# Patient Record
Sex: FEMALE | Race: WHITE | Employment: OTHER | URBAN - METROPOLITAN AREA
[De-identification: names, ages, dates, MRNs, and addresses within clinical notes are randomized per-mention and may not be internally consistent; named-entity substitution may affect disease eponyms.]

---

## 2017-08-15 PROBLEM — K21.9 GERD (GASTROESOPHAGEAL REFLUX DISEASE): Status: ACTIVE | Noted: 2017-08-15

## 2017-08-15 PROBLEM — F32.A ANXIETY AND DEPRESSION: Status: ACTIVE | Noted: 2017-08-15

## 2017-08-15 PROBLEM — M51.36 DEGENERATIVE DISC DISEASE, LUMBAR: Status: ACTIVE | Noted: 2017-08-15

## 2017-08-15 PROBLEM — G89.29 CHRONIC BACK PAIN: Status: ACTIVE | Noted: 2017-08-15

## 2017-08-15 PROBLEM — E87.6 HYPOKALEMIA: Status: ACTIVE | Noted: 2017-08-15

## 2017-08-15 PROBLEM — I10 HTN (HYPERTENSION): Status: ACTIVE | Noted: 2017-08-15

## 2017-08-15 PROBLEM — R59.0 INTRA-ABDOMINAL LYMPHADENOPATHY: Status: ACTIVE | Noted: 2017-08-15

## 2017-08-15 PROBLEM — E78.5 HLD (HYPERLIPIDEMIA): Status: ACTIVE | Noted: 2017-08-15

## 2017-08-15 PROBLEM — F41.9 ANXIETY AND DEPRESSION: Status: ACTIVE | Noted: 2017-08-15

## 2017-08-15 PROBLEM — K62.5 RECTAL BLEEDING: Status: ACTIVE | Noted: 2017-08-15

## 2017-08-15 PROBLEM — M54.9 CHRONIC BACK PAIN: Status: ACTIVE | Noted: 2017-08-15

## 2017-08-24 PROBLEM — C91.10 CLL (CHRONIC LYMPHOCYTIC LEUKEMIA) (HCC): Status: ACTIVE | Noted: 2017-08-24

## 2017-08-24 PROBLEM — G89.29 CHRONIC LOW BACK PAIN: Status: ACTIVE | Noted: 2017-08-24

## 2017-08-24 PROBLEM — C83.00 MALIGNANT LYMPHOMA, SMALL LYMPHOCYTIC (HCC): Status: ACTIVE | Noted: 2017-08-24

## 2017-08-24 PROBLEM — M54.50 CHRONIC LOW BACK PAIN: Status: ACTIVE | Noted: 2017-08-24

## 2017-08-29 ENCOUNTER — HOSPITAL ENCOUNTER (OUTPATIENT)
Dept: CT IMAGING | Age: 62
Discharge: HOME | End: 2017-08-29
Attending: INTERNAL MEDICINE | Admitting: INTERNAL MEDICINE

## 2017-08-29 DIAGNOSIS — C83.00 SMALL CELL B-CELL LYMPHOMA (HCC): ICD-10-CM

## 2017-08-29 DIAGNOSIS — C83.00 MALIGNANT LYMPHOMA, SMALL LYMPHOCYTIC (HCC): ICD-10-CM

## 2017-09-01 ENCOUNTER — PRE-PROCEDURE TELEPHONE (OUTPATIENT)
Dept: INTERVENTIONAL RADIOLOGY/VASCULAR | Age: 62
End: 2017-09-01

## 2017-09-05 ENCOUNTER — HOSPITAL ENCOUNTER (OUTPATIENT)
Dept: CT IMAGING | Age: 62
Discharge: HOME | End: 2017-09-05
Attending: NURSE PRACTITIONER | Admitting: NURSE PRACTITIONER

## 2017-09-05 VITALS
HEIGHT: 63 IN | WEIGHT: 189 LBS | BODY MASS INDEX: 33.49 KG/M2 | HEART RATE: 83 BPM | DIASTOLIC BLOOD PRESSURE: 84 MMHG | OXYGEN SATURATION: 99 % | RESPIRATION RATE: 16 BRPM | TEMPERATURE: 98.1 F | SYSTOLIC BLOOD PRESSURE: 142 MMHG

## 2017-09-05 DIAGNOSIS — C83.00 MALIGNANT LYMPHOMA, SMALL LYMPHOCYTIC (HCC): ICD-10-CM

## 2017-09-05 DIAGNOSIS — C83.00 SMALL CELL B-CELL LYMPHOMA (HCC): ICD-10-CM

## 2017-09-05 LAB
A/G RATIO: 1.4 (ref 1.1–2.2)
ALBUMIN SERPL-MCNC: 4.6 G/DL (ref 3.4–5)
ALP BLD-CCNC: 95 U/L (ref 40–129)
ALT SERPL-CCNC: 16 U/L (ref 10–40)
ANION GAP SERPL CALCULATED.3IONS-SCNC: 16 MMOL/L (ref 3–16)
APTT: 31.5 SEC (ref 24.1–34.9)
AST SERPL-CCNC: 19 U/L (ref 15–37)
BASOPHILS ABSOLUTE: 0.1 K/UL (ref 0–0.2)
BASOPHILS RELATIVE PERCENT: 0.6 %
BILIRUB SERPL-MCNC: <0.2 MG/DL (ref 0–1)
BUN BLDV-MCNC: 7 MG/DL (ref 7–20)
CALCIUM SERPL-MCNC: 9.2 MG/DL (ref 8.3–10.6)
CHLORIDE BLD-SCNC: 103 MMOL/L (ref 99–110)
CO2: 26 MMOL/L (ref 21–32)
CREAT SERPL-MCNC: 0.7 MG/DL (ref 0.6–1.1)
EOSINOPHILS ABSOLUTE: 0.1 K/UL (ref 0–0.6)
EOSINOPHILS RELATIVE PERCENT: 1.1 %
GFR AFRICAN AMERICAN: >60
GFR NON-AFRICAN AMERICAN: >60
GLOBULIN: 3.2 G/DL
GLUCOSE BLD-MCNC: 110 MG/DL (ref 70–99)
HCT VFR BLD CALC: 35.4 % (ref 36–48)
HEMOGLOBIN: 11.7 G/DL (ref 12–16)
INR BLD: 1.04 (ref 0.85–1.15)
LYMPHOCYTES ABSOLUTE: 2.1 K/UL (ref 1–5.1)
LYMPHOCYTES RELATIVE PERCENT: 23.6 %
MCH RBC QN AUTO: 30.2 PG (ref 26–34)
MCHC RBC AUTO-ENTMCNC: 33 G/DL (ref 31–36)
MCV RBC AUTO: 91.6 FL (ref 80–100)
MONOCYTES ABSOLUTE: 0.5 K/UL (ref 0–1.3)
MONOCYTES RELATIVE PERCENT: 5.8 %
NEUTROPHILS ABSOLUTE: 6 K/UL (ref 1.7–7.7)
NEUTROPHILS RELATIVE PERCENT: 68.9 %
PDW BLD-RTO: 13.9 % (ref 12.4–15.4)
PLATELET # BLD: 297 K/UL (ref 135–450)
PMV BLD AUTO: 8 FL (ref 5–10.5)
POTASSIUM SERPL-SCNC: 3.7 MMOL/L (ref 3.5–5.1)
PROTHROMBIN TIME: 11.8 SEC (ref 9.6–13)
RBC # BLD: 3.87 M/UL (ref 4–5.2)
SODIUM BLD-SCNC: 145 MMOL/L (ref 136–145)
TOTAL PROTEIN: 7.8 G/DL (ref 6.4–8.2)
WBC # BLD: 8.7 K/UL (ref 4–11)

## 2017-09-05 RX ORDER — ONDANSETRON 2 MG/ML
4 INJECTION INTRAMUSCULAR; INTRAVENOUS EVERY 8 HOURS PRN
Status: DISCONTINUED | OUTPATIENT
Start: 2017-09-05 | End: 2017-09-06 | Stop reason: HOSPADM

## 2017-09-05 RX ORDER — FENTANYL CITRATE 50 UG/ML
INJECTION, SOLUTION INTRAMUSCULAR; INTRAVENOUS
Status: COMPLETED | OUTPATIENT
Start: 2017-09-05 | End: 2017-09-05

## 2017-09-05 RX ORDER — MIDAZOLAM HYDROCHLORIDE 1 MG/ML
INJECTION INTRAMUSCULAR; INTRAVENOUS
Status: COMPLETED | OUTPATIENT
Start: 2017-09-05 | End: 2017-09-05

## 2017-09-05 RX ADMIN — MIDAZOLAM HYDROCHLORIDE 2 MG: 1 INJECTION INTRAMUSCULAR; INTRAVENOUS at 10:42

## 2017-09-05 RX ADMIN — FENTANYL CITRATE 50 MCG: 50 INJECTION, SOLUTION INTRAMUSCULAR; INTRAVENOUS at 10:45

## 2017-09-05 RX ADMIN — FENTANYL CITRATE 50 MCG: 50 INJECTION, SOLUTION INTRAMUSCULAR; INTRAVENOUS at 10:42

## 2017-09-05 RX ADMIN — MIDAZOLAM HYDROCHLORIDE 1 MG: 1 INJECTION INTRAMUSCULAR; INTRAVENOUS at 10:45

## 2017-09-12 PROBLEM — R60.0 BILATERAL LOWER EXTREMITY EDEMA: Status: ACTIVE | Noted: 2017-09-12

## 2017-09-18 LAB
Lab: NORMAL
Lab: NORMAL
REPORT: NORMAL
REPORT: NORMAL
THIS TEST SENT TO: NORMAL
THIS TEST SENT TO: NORMAL

## 2017-10-10 ENCOUNTER — HOSPITAL ENCOUNTER (OUTPATIENT)
Dept: INTERVENTIONAL RADIOLOGY/VASCULAR | Age: 62
Discharge: HOME | End: 2017-10-10
Attending: INTERNAL MEDICINE | Admitting: INTERNAL MEDICINE

## 2017-10-10 VITALS
OXYGEN SATURATION: 95 % | DIASTOLIC BLOOD PRESSURE: 76 MMHG | SYSTOLIC BLOOD PRESSURE: 120 MMHG | TEMPERATURE: 98 F | WEIGHT: 183 LBS | HEIGHT: 62 IN | HEART RATE: 70 BPM | BODY MASS INDEX: 33.68 KG/M2 | RESPIRATION RATE: 20 BRPM

## 2017-10-10 DIAGNOSIS — C85.90 LYMPHOMA, UNSPECIFIED BODY REGION, UNSPECIFIED LYMPHOMA TYPE (HCC): ICD-10-CM

## 2017-10-10 RX ORDER — MIDAZOLAM HYDROCHLORIDE 1 MG/ML
INJECTION INTRAMUSCULAR; INTRAVENOUS
Status: COMPLETED | OUTPATIENT
Start: 2017-10-10 | End: 2017-10-10

## 2017-10-10 RX ORDER — PROMETHAZINE HYDROCHLORIDE 25 MG/ML
12.5 INJECTION, SOLUTION INTRAMUSCULAR; INTRAVENOUS ONCE
Status: DISCONTINUED | OUTPATIENT
Start: 2017-10-10 | End: 2017-10-10

## 2017-10-10 RX ORDER — ONDANSETRON 2 MG/ML
4 INJECTION INTRAMUSCULAR; INTRAVENOUS EVERY 8 HOURS PRN
Status: DISCONTINUED | OUTPATIENT
Start: 2017-10-10 | End: 2017-10-11 | Stop reason: HOSPADM

## 2017-10-10 RX ORDER — FENTANYL CITRATE 50 UG/ML
INJECTION, SOLUTION INTRAMUSCULAR; INTRAVENOUS
Status: COMPLETED | OUTPATIENT
Start: 2017-10-10 | End: 2017-10-10

## 2017-10-10 RX ORDER — PROMETHAZINE HYDROCHLORIDE 25 MG/ML
12.5 INJECTION, SOLUTION INTRAMUSCULAR; INTRAVENOUS ONCE
Status: COMPLETED | OUTPATIENT
Start: 2017-10-10 | End: 2017-10-10

## 2017-10-10 RX ADMIN — FENTANYL CITRATE 50 MCG: 50 INJECTION, SOLUTION INTRAMUSCULAR; INTRAVENOUS at 09:27

## 2017-10-10 RX ADMIN — MIDAZOLAM HYDROCHLORIDE 1 MG: 1 INJECTION INTRAMUSCULAR; INTRAVENOUS at 09:27

## 2017-10-10 RX ADMIN — FENTANYL CITRATE 50 MCG: 50 INJECTION, SOLUTION INTRAMUSCULAR; INTRAVENOUS at 09:30

## 2017-10-10 RX ADMIN — MIDAZOLAM HYDROCHLORIDE 1 MG: 1 INJECTION INTRAMUSCULAR; INTRAVENOUS at 09:24

## 2017-10-10 RX ADMIN — MIDAZOLAM HYDROCHLORIDE 1 MG: 1 INJECTION INTRAMUSCULAR; INTRAVENOUS at 09:29

## 2017-10-10 RX ADMIN — FENTANYL CITRATE 50 MCG: 50 INJECTION, SOLUTION INTRAMUSCULAR; INTRAVENOUS at 09:24

## 2017-10-10 RX ADMIN — PROMETHAZINE HYDROCHLORIDE 12.5 MG: 25 INJECTION, SOLUTION INTRAMUSCULAR; INTRAVENOUS at 09:18

## 2017-10-10 ASSESSMENT — PAIN - FUNCTIONAL ASSESSMENT: PAIN_FUNCTIONAL_ASSESSMENT: 0-10

## 2017-10-10 ASSESSMENT — PAIN SCALES - GENERAL
PAINLEVEL_OUTOF10: 0
PAINLEVEL_OUTOF10: 0

## 2017-10-10 NOTE — PRE SEDATION
tablet Take 10 mg by mouth daily. Historical Provider, MD   pravastatin (PRAVACHOL) 20 MG tablet Take 20 mg by mouth daily. Historical Provider, MD   FLUoxetine (PROZAC) 20 MG capsule Take 60 mg by mouth daily. Historical Provider, MD   SUMAtriptan (IMITREX) 25 MG tablet Take 100 mg by mouth once as needed for Migraine     Historical Provider, MD     Coumadin Use Last 7 Days:  no  Antiplatelet drug therapy use last 7 days: no  Other anticoagulant use last 7 days: no  Additional Medication Information:  See chart      Pre-Sedation Documentation and Exam:   Vital signs have been reviewed (see flow sheet for vitals). I have reviewed the patient's history and review of systems.     Mallampati Airway Assessment:  Mallampati Class II - (soft palate, fauces & uvula are visible)    Prior History of Anesthesia Complications:   none    ASA Classification:  Class 2 - A normal healthy patient with mild systemic disease    Sedation/ Anesthesia Plan:   intravenous sedation    Medications Planned:   midazolam (Versed) intravenously    Patient is an appropriate candidate for plan of sedation: yes    Electronically signed by Lilia Lombardo MD on 10/10/2017 at 9:19 AM

## 2017-10-10 NOTE — PROGRESS NOTES
Pt arrives to pre procedure area in stable condition from home. Vital signs stable. Assessment completed see flow sheet. IV patent. NS hung at 50 cc/hr. Procedure explained to pt who states understanding and questions answered. Consent signed.

## 2017-10-11 NOTE — PROGRESS NOTES
Spoke to patient post lisa cath placement procedure. Patients site was clean, dry and intact. Patient denies any discomfort post procedure. I addressed all the patients concerns, and directed patient to contact Special Procedures if they had any further questions.

## 2017-10-21 ENCOUNTER — HOSPITAL ENCOUNTER (OUTPATIENT)
Dept: OTHER | Age: 62
Discharge: HOME | End: 2017-10-21
Attending: INTERNAL MEDICINE | Admitting: INTERNAL MEDICINE

## 2017-10-21 DIAGNOSIS — R52 PAIN: ICD-10-CM

## 2017-11-01 ENCOUNTER — HOSPITAL ENCOUNTER (OUTPATIENT)
Dept: OTHER | Age: 62
Discharge: HOME | End: 2017-11-01
Attending: INTERNAL MEDICINE | Admitting: INTERNAL MEDICINE

## 2017-11-01 LAB
A/G RATIO: 1.2 (ref 1.1–2.2)
ALBUMIN SERPL-MCNC: 3.8 G/DL (ref 3.4–5)
ALP BLD-CCNC: 100 U/L (ref 40–129)
ALT SERPL-CCNC: 14 U/L (ref 10–40)
AMYLASE: 64 U/L (ref 25–115)
ANION GAP SERPL CALCULATED.3IONS-SCNC: 14 MMOL/L (ref 3–16)
AST SERPL-CCNC: 14 U/L (ref 15–37)
BASOPHILS ABSOLUTE: 0.1 K/UL (ref 0–0.2)
BASOPHILS RELATIVE PERCENT: 1.4 %
BILIRUB SERPL-MCNC: <0.2 MG/DL (ref 0–1)
BUN BLDV-MCNC: 11 MG/DL (ref 7–20)
CALCIUM SERPL-MCNC: 9.1 MG/DL (ref 8.3–10.6)
CHLORIDE BLD-SCNC: 102 MMOL/L (ref 99–110)
CO2: 25 MMOL/L (ref 21–32)
CREAT SERPL-MCNC: 0.7 MG/DL (ref 0.6–1.2)
EOSINOPHILS ABSOLUTE: 0.1 K/UL (ref 0–0.6)
EOSINOPHILS RELATIVE PERCENT: 2.3 %
GFR AFRICAN AMERICAN: >60
GFR NON-AFRICAN AMERICAN: >60
GLOBULIN: 3.2 G/DL
GLUCOSE BLD-MCNC: 99 MG/DL (ref 70–99)
HCT VFR BLD CALC: 33.6 % (ref 36–48)
HEMOGLOBIN: 10.9 G/DL (ref 12–16)
LIPASE: 103 U/L (ref 13–60)
LYMPHOCYTES ABSOLUTE: 0.8 K/UL (ref 1–5.1)
LYMPHOCYTES RELATIVE PERCENT: 16.9 %
MCH RBC QN AUTO: 28.5 PG (ref 26–34)
MCHC RBC AUTO-ENTMCNC: 32.5 G/DL (ref 31–36)
MCV RBC AUTO: 87.6 FL (ref 80–100)
MONOCYTES ABSOLUTE: 0.4 K/UL (ref 0–1.3)
MONOCYTES RELATIVE PERCENT: 8.6 %
NEUTROPHILS ABSOLUTE: 3.3 K/UL (ref 1.7–7.7)
NEUTROPHILS RELATIVE PERCENT: 70.8 %
PDW BLD-RTO: 14.2 % (ref 12.4–15.4)
PLATELET # BLD: 215 K/UL (ref 135–450)
PMV BLD AUTO: 8.5 FL (ref 5–10.5)
POTASSIUM SERPL-SCNC: 4 MMOL/L (ref 3.5–5.1)
RBC # BLD: 3.84 M/UL (ref 4–5.2)
SODIUM BLD-SCNC: 141 MMOL/L (ref 136–145)
TOTAL PROTEIN: 7 G/DL (ref 6.4–8.2)
WBC # BLD: 4.6 K/UL (ref 4–11)

## 2017-11-10 ENCOUNTER — POST-OP TELEPHONE (OUTPATIENT)
Dept: INTERVENTIONAL RADIOLOGY/VASCULAR | Age: 62
End: 2017-11-10

## 2017-11-10 NOTE — PROGRESS NOTES
Patients chart was reviewed 30 days post lisa cath procedure. No complications were noted post procedure.

## 2018-03-30 ENCOUNTER — HOSPITAL ENCOUNTER (OUTPATIENT)
Dept: CT IMAGING | Age: 63
Discharge: HOME | End: 2018-03-30
Attending: INTERNAL MEDICINE | Admitting: INTERNAL MEDICINE

## 2018-03-30 DIAGNOSIS — C91.10 LEUKEMIA, LYMPHOCYTIC, CHRONIC (HCC): ICD-10-CM

## 2018-03-30 DIAGNOSIS — C91.10 CHRONIC LYMPHOCYTIC LEUKEMIA OF B-CELL TYPE NOT HAVING ACHIEVED REMISSION (HCC): ICD-10-CM

## 2018-07-03 ENCOUNTER — HOSPITAL ENCOUNTER (OUTPATIENT)
Dept: VASCULAR LAB | Age: 63
Discharge: HOME | End: 2018-07-03
Attending: NURSE PRACTITIONER | Admitting: NURSE PRACTITIONER

## 2018-07-03 DIAGNOSIS — R60.0 LOCALIZED EDEMA: ICD-10-CM

## 2018-07-10 ENCOUNTER — HOSPITAL ENCOUNTER (OUTPATIENT)
Dept: CT IMAGING | Age: 63
Discharge: HOME | End: 2018-07-10
Attending: NURSE PRACTITIONER | Admitting: NURSE PRACTITIONER

## 2018-07-10 DIAGNOSIS — C91.10 LEUKEMIA, LYMPHOCYTIC, CHRONIC (HCC): ICD-10-CM

## 2018-07-10 DIAGNOSIS — C91.10 CHRONIC LYMPHOCYTIC LEUKEMIA OF B-CELL TYPE NOT HAVING ACHIEVED REMISSION (HCC): ICD-10-CM

## 2018-07-12 ENCOUNTER — HOSPITAL ENCOUNTER (OUTPATIENT)
Dept: VASCULAR LAB | Age: 63
Discharge: HOME | End: 2018-07-12
Attending: NURSE PRACTITIONER | Admitting: NURSE PRACTITIONER

## 2018-07-12 DIAGNOSIS — R60.0 LOCALIZED EDEMA: ICD-10-CM

## 2018-07-12 DIAGNOSIS — R60.0 BILATERAL LEG EDEMA: ICD-10-CM

## 2018-11-18 ENCOUNTER — APPOINTMENT (OUTPATIENT)
Dept: CT IMAGING | Age: 63
DRG: 853 | End: 2018-11-18
Payer: MEDICARE

## 2018-11-18 ENCOUNTER — HOSPITAL ENCOUNTER (INPATIENT)
Age: 63
LOS: 10 days | Discharge: HOME | DRG: 853 | End: 2018-11-28
Attending: EMERGENCY MEDICINE | Admitting: INTERNAL MEDICINE
Payer: MEDICARE

## 2018-11-18 ENCOUNTER — APPOINTMENT (OUTPATIENT)
Dept: GENERAL RADIOLOGY | Age: 63
DRG: 853 | End: 2018-11-18
Payer: MEDICARE

## 2018-11-18 DIAGNOSIS — J18.9 PNEUMONIA DUE TO ORGANISM: Primary | ICD-10-CM

## 2018-11-18 DIAGNOSIS — J40 BRONCHITIS: ICD-10-CM

## 2018-11-18 DIAGNOSIS — A41.9 SEPTICEMIA (HCC): ICD-10-CM

## 2018-11-18 LAB
A/G RATIO: 1.1 (ref 1.1–2.2)
ALBUMIN SERPL-MCNC: 3.7 G/DL (ref 3.4–5)
ALP BLD-CCNC: 173 U/L (ref 40–129)
ALT SERPL-CCNC: 20 U/L (ref 10–40)
ANION GAP SERPL CALCULATED.3IONS-SCNC: 11 MMOL/L (ref 3–16)
AST SERPL-CCNC: 24 U/L (ref 15–37)
BASOPHILS ABSOLUTE: 0 K/UL (ref 0–0.2)
BASOPHILS RELATIVE PERCENT: 0.4 %
BILIRUB SERPL-MCNC: 0.4 MG/DL (ref 0–1)
BILIRUBIN URINE: NEGATIVE
BLOOD, URINE: NEGATIVE
BUN BLDV-MCNC: 15 MG/DL (ref 7–20)
CALCIUM SERPL-MCNC: 8.6 MG/DL (ref 8.3–10.6)
CHLORIDE BLD-SCNC: 105 MMOL/L (ref 99–110)
CLARITY: ABNORMAL
CO2: 24 MMOL/L (ref 21–32)
COLOR: YELLOW
CREAT SERPL-MCNC: 1 MG/DL (ref 0.6–1.2)
EOSINOPHILS ABSOLUTE: 0 K/UL (ref 0–0.6)
EOSINOPHILS RELATIVE PERCENT: 0.2 %
EPITHELIAL CELLS, UA: 1 /HPF (ref 0–5)
GFR AFRICAN AMERICAN: >60
GFR NON-AFRICAN AMERICAN: 56
GLOBULIN: 3.5 G/DL
GLUCOSE BLD-MCNC: 100 MG/DL (ref 70–99)
GLUCOSE URINE: NEGATIVE MG/DL
HCT VFR BLD CALC: 35.3 % (ref 36–48)
HEMOGLOBIN: 11.9 G/DL (ref 12–16)
HYALINE CASTS: 0 /LPF (ref 0–8)
KETONES, URINE: NEGATIVE MG/DL
LACTIC ACID: 0.5 MMOL/L (ref 0.4–2)
LEUKOCYTE ESTERASE, URINE: NEGATIVE
LYMPHOCYTES ABSOLUTE: 0.7 K/UL (ref 1–5.1)
LYMPHOCYTES RELATIVE PERCENT: 8.4 %
MCH RBC QN AUTO: 32.5 PG (ref 26–34)
MCHC RBC AUTO-ENTMCNC: 33.8 G/DL (ref 31–36)
MCV RBC AUTO: 96 FL (ref 80–100)
MICROSCOPIC EXAMINATION: YES
MONOCYTES ABSOLUTE: 0.6 K/UL (ref 0–1.3)
MONOCYTES RELATIVE PERCENT: 6.9 %
NEUTROPHILS ABSOLUTE: 7 K/UL (ref 1.7–7.7)
NEUTROPHILS RELATIVE PERCENT: 84.1 %
NITRITE, URINE: NEGATIVE
PDW BLD-RTO: 13.3 % (ref 12.4–15.4)
PH UA: 6
PLATELET # BLD: 410 K/UL (ref 135–450)
PMV BLD AUTO: 7.6 FL (ref 5–10.5)
POTASSIUM REFLEX MAGNESIUM: 3.9 MMOL/L (ref 3.5–5.1)
PROTEIN UA: NEGATIVE MG/DL
RAPID INFLUENZA  B AGN: NEGATIVE
RAPID INFLUENZA A AGN: NEGATIVE
RBC # BLD: 3.68 M/UL (ref 4–5.2)
RBC UA: 1 /HPF (ref 0–4)
SODIUM BLD-SCNC: 140 MMOL/L (ref 136–145)
SPECIFIC GRAVITY UA: 1.02
TOTAL PROTEIN: 7.2 G/DL (ref 6.4–8.2)
URINE REFLEX TO CULTURE: ABNORMAL
URINE TYPE: ABNORMAL
UROBILINOGEN, URINE: 1 E.U./DL
WBC # BLD: 8.3 K/UL (ref 4–11)
WBC UA: 1 /HPF (ref 0–5)

## 2018-11-18 PROCEDURE — 94664 DEMO&/EVAL PT USE INHALER: CPT

## 2018-11-18 PROCEDURE — 36415 COLL VENOUS BLD VENIPUNCTURE: CPT

## 2018-11-18 PROCEDURE — 81001 URINALYSIS AUTO W/SCOPE: CPT

## 2018-11-18 PROCEDURE — 94640 AIRWAY INHALATION TREATMENT: CPT

## 2018-11-18 PROCEDURE — 71260 CT THORAX DX C+: CPT

## 2018-11-18 PROCEDURE — 6360000004 HC RX CONTRAST MEDICATION: Performed by: PHYSICIAN ASSISTANT

## 2018-11-18 PROCEDURE — 6370000000 HC RX 637 (ALT 250 FOR IP): Performed by: INTERNAL MEDICINE

## 2018-11-18 PROCEDURE — 6360000002 HC RX W HCPCS: Performed by: PHYSICIAN ASSISTANT

## 2018-11-18 PROCEDURE — 99285 EMERGENCY DEPT VISIT HI MDM: CPT

## 2018-11-18 PROCEDURE — 85025 COMPLETE CBC W/AUTO DIFF WBC: CPT

## 2018-11-18 PROCEDURE — 80053 COMPREHEN METABOLIC PANEL: CPT

## 2018-11-18 PROCEDURE — 2580000003 HC RX 258: Performed by: INTERNAL MEDICINE

## 2018-11-18 PROCEDURE — 71046 X-RAY EXAM CHEST 2 VIEWS: CPT

## 2018-11-18 PROCEDURE — 1200000000 HC SEMI PRIVATE

## 2018-11-18 PROCEDURE — 2500000003 HC RX 250 WO HCPCS: Performed by: PHYSICIAN ASSISTANT

## 2018-11-18 PROCEDURE — 6360000002 HC RX W HCPCS: Performed by: INTERNAL MEDICINE

## 2018-11-18 PROCEDURE — 2580000003 HC RX 258: Performed by: PHYSICIAN ASSISTANT

## 2018-11-18 PROCEDURE — 74177 CT ABD & PELVIS W/CONTRAST: CPT

## 2018-11-18 PROCEDURE — 96375 TX/PRO/DX INJ NEW DRUG ADDON: CPT

## 2018-11-18 PROCEDURE — 96374 THER/PROPH/DIAG INJ IV PUSH: CPT

## 2018-11-18 PROCEDURE — 94760 N-INVAS EAR/PLS OXIMETRY 1: CPT

## 2018-11-18 PROCEDURE — 6370000000 HC RX 637 (ALT 250 FOR IP): Performed by: PHYSICIAN ASSISTANT

## 2018-11-18 PROCEDURE — 83605 ASSAY OF LACTIC ACID: CPT

## 2018-11-18 PROCEDURE — 87804 INFLUENZA ASSAY W/OPTIC: CPT

## 2018-11-18 PROCEDURE — 87040 BLOOD CULTURE FOR BACTERIA: CPT

## 2018-11-18 PROCEDURE — 6370000000 HC RX 637 (ALT 250 FOR IP)

## 2018-11-18 RX ORDER — FUROSEMIDE 20 MG/1
20 TABLET ORAL 2 TIMES DAILY
Status: DISCONTINUED | OUTPATIENT
Start: 2018-11-18 | End: 2018-11-18

## 2018-11-18 RX ORDER — ONDANSETRON 2 MG/ML
4 INJECTION INTRAMUSCULAR; INTRAVENOUS EVERY 6 HOURS PRN
Status: DISCONTINUED | OUTPATIENT
Start: 2018-11-18 | End: 2018-11-28 | Stop reason: HOSPADM

## 2018-11-18 RX ORDER — LEVOFLOXACIN 5 MG/ML
500 INJECTION, SOLUTION INTRAVENOUS EVERY 24 HOURS
Status: DISCONTINUED | OUTPATIENT
Start: 2018-11-20 | End: 2018-11-22

## 2018-11-18 RX ORDER — FLUOXETINE HYDROCHLORIDE 20 MG/1
60 CAPSULE ORAL DAILY
Status: DISCONTINUED | OUTPATIENT
Start: 2018-11-19 | End: 2018-11-22

## 2018-11-18 RX ORDER — 0.9 % SODIUM CHLORIDE 0.9 %
1000 INTRAVENOUS SOLUTION INTRAVENOUS ONCE
Status: COMPLETED | OUTPATIENT
Start: 2018-11-18 | End: 2018-11-18

## 2018-11-18 RX ORDER — LEVOFLOXACIN 750 MG/1
750 TABLET ORAL DAILY
Status: DISCONTINUED | OUTPATIENT
Start: 2018-11-18 | End: 2018-11-19 | Stop reason: SDUPTHER

## 2018-11-18 RX ORDER — BENZONATATE 100 MG/1
100 CAPSULE ORAL 3 TIMES DAILY PRN
Qty: 20 CAPSULE | Refills: 0 | Status: SHIPPED | OUTPATIENT
Start: 2018-11-18 | End: 2019-12-20 | Stop reason: ALTCHOICE

## 2018-11-18 RX ORDER — GABAPENTIN 600 MG/1
600 TABLET ORAL 4 TIMES DAILY
COMMUNITY

## 2018-11-18 RX ORDER — ALPRAZOLAM 0.5 MG/1
2 TABLET ORAL 3 TIMES DAILY PRN
Status: DISCONTINUED | OUTPATIENT
Start: 2018-11-18 | End: 2018-11-28 | Stop reason: HOSPADM

## 2018-11-18 RX ORDER — CODEINE PHOSPHATE AND GUAIFENESIN 10; 100 MG/5ML; MG/5ML
5 SOLUTION ORAL EVERY 4 HOURS PRN
Status: DISCONTINUED | OUTPATIENT
Start: 2018-11-18 | End: 2018-11-28 | Stop reason: HOSPADM

## 2018-11-18 RX ORDER — AMLODIPINE BESYLATE 5 MG/1
10 TABLET ORAL DAILY
Status: DISCONTINUED | OUTPATIENT
Start: 2018-11-19 | End: 2018-11-28 | Stop reason: HOSPADM

## 2018-11-18 RX ORDER — OXYCODONE AND ACETAMINOPHEN 10; 325 MG/1; MG/1
1 TABLET ORAL 4 TIMES DAILY
Status: DISCONTINUED | OUTPATIENT
Start: 2018-11-18 | End: 2018-11-20

## 2018-11-18 RX ORDER — ALBUTEROL SULFATE 90 UG/1
AEROSOL, METERED RESPIRATORY (INHALATION)
Qty: 1 INHALER | Refills: 0 | Status: SHIPPED | OUTPATIENT
Start: 2018-11-18

## 2018-11-18 RX ORDER — LEVOFLOXACIN 5 MG/ML
500 INJECTION, SOLUTION INTRAVENOUS ONCE
Status: DISCONTINUED | OUTPATIENT
Start: 2018-11-18 | End: 2018-11-18

## 2018-11-18 RX ORDER — PANTOPRAZOLE SODIUM 40 MG/1
40 TABLET, DELAYED RELEASE ORAL
Status: DISCONTINUED | OUTPATIENT
Start: 2018-11-19 | End: 2018-11-28 | Stop reason: HOSPADM

## 2018-11-18 RX ORDER — SODIUM CHLORIDE 9 MG/ML
INJECTION, SOLUTION INTRAVENOUS CONTINUOUS
Status: DISCONTINUED | OUTPATIENT
Start: 2018-11-18 | End: 2018-11-25

## 2018-11-18 RX ORDER — FUROSEMIDE 20 MG/1
20 TABLET ORAL DAILY
Status: DISCONTINUED | OUTPATIENT
Start: 2018-11-19 | End: 2018-11-28 | Stop reason: HOSPADM

## 2018-11-18 RX ORDER — HYDROMORPHONE HCL 110MG/55ML
0.5 PATIENT CONTROLLED ANALGESIA SYRINGE INTRAVENOUS EVERY 30 MIN PRN
Status: DISCONTINUED | OUTPATIENT
Start: 2018-11-18 | End: 2018-11-19

## 2018-11-18 RX ORDER — IBUPROFEN 800 MG/1
TABLET ORAL
Status: COMPLETED
Start: 2018-11-18 | End: 2018-11-18

## 2018-11-18 RX ORDER — 0.9 % SODIUM CHLORIDE 0.9 %
30 INTRAVENOUS SOLUTION INTRAVENOUS ONCE
Status: COMPLETED | OUTPATIENT
Start: 2018-11-18 | End: 2018-11-18

## 2018-11-18 RX ORDER — DOCUSATE SODIUM 100 MG/1
100 CAPSULE, LIQUID FILLED ORAL DAILY
Status: DISCONTINUED | OUTPATIENT
Start: 2018-11-19 | End: 2018-11-28 | Stop reason: HOSPADM

## 2018-11-18 RX ORDER — ACETAMINOPHEN 325 MG/1
650 TABLET ORAL EVERY 4 HOURS PRN
Status: DISCONTINUED | OUTPATIENT
Start: 2018-11-18 | End: 2018-11-28 | Stop reason: HOSPADM

## 2018-11-18 RX ORDER — OMEPRAZOLE 20 MG/1
40 CAPSULE, DELAYED RELEASE ORAL DAILY
Status: DISCONTINUED | OUTPATIENT
Start: 2018-11-18 | End: 2018-11-18 | Stop reason: CLARIF

## 2018-11-18 RX ORDER — IPRATROPIUM BROMIDE AND ALBUTEROL SULFATE 2.5; .5 MG/3ML; MG/3ML
1 SOLUTION RESPIRATORY (INHALATION) ONCE
Status: COMPLETED | OUTPATIENT
Start: 2018-11-18 | End: 2018-11-18

## 2018-11-18 RX ORDER — IPRATROPIUM BROMIDE AND ALBUTEROL SULFATE 2.5; .5 MG/3ML; MG/3ML
1 SOLUTION RESPIRATORY (INHALATION)
Status: DISCONTINUED | OUTPATIENT
Start: 2018-11-18 | End: 2018-11-18

## 2018-11-18 RX ORDER — SODIUM CHLORIDE 0.9 % (FLUSH) 0.9 %
10 SYRINGE (ML) INJECTION PRN
Status: DISCONTINUED | OUTPATIENT
Start: 2018-11-18 | End: 2018-11-28 | Stop reason: HOSPADM

## 2018-11-18 RX ORDER — PRAVASTATIN SODIUM 20 MG
20 TABLET ORAL DAILY
Status: DISCONTINUED | OUTPATIENT
Start: 2018-11-19 | End: 2018-11-28 | Stop reason: HOSPADM

## 2018-11-18 RX ORDER — OXYCODONE HYDROCHLORIDE 5 MG/1
5 TABLET ORAL ONCE
Status: COMPLETED | OUTPATIENT
Start: 2018-11-18 | End: 2018-11-18

## 2018-11-18 RX ORDER — SODIUM CHLORIDE 0.9 % (FLUSH) 0.9 %
10 SYRINGE (ML) INJECTION EVERY 12 HOURS SCHEDULED
Status: DISCONTINUED | OUTPATIENT
Start: 2018-11-18 | End: 2018-11-28 | Stop reason: HOSPADM

## 2018-11-18 RX ORDER — SUMATRIPTAN 25 MG/1
100 TABLET, FILM COATED ORAL
Status: COMPLETED | OUTPATIENT
Start: 2018-11-18 | End: 2018-11-18

## 2018-11-18 RX ORDER — OXYCODONE AND ACETAMINOPHEN 10; 325 MG/1; MG/1
1 TABLET ORAL 4 TIMES DAILY
COMMUNITY

## 2018-11-18 RX ORDER — LIDOCAINE AND PRILOCAINE 25; 25 MG/G; MG/G
CREAM TOPICAL PRN
Status: DISCONTINUED | OUTPATIENT
Start: 2018-11-18 | End: 2018-11-28 | Stop reason: HOSPADM

## 2018-11-18 RX ORDER — GABAPENTIN 300 MG/1
600 CAPSULE ORAL 3 TIMES DAILY
Status: DISCONTINUED | OUTPATIENT
Start: 2018-11-18 | End: 2018-11-28 | Stop reason: HOSPADM

## 2018-11-18 RX ORDER — LEVOFLOXACIN 750 MG/1
750 TABLET ORAL DAILY
Qty: 7 TABLET | Refills: 0 | Status: SHIPPED | OUTPATIENT
Start: 2018-11-18 | End: 2018-11-25

## 2018-11-18 RX ORDER — POTASSIUM CHLORIDE 20 MEQ/1
40 TABLET, EXTENDED RELEASE ORAL PRN
Status: DISCONTINUED | OUTPATIENT
Start: 2018-11-18 | End: 2018-11-28 | Stop reason: HOSPADM

## 2018-11-18 RX ORDER — IPRATROPIUM BROMIDE AND ALBUTEROL SULFATE 2.5; .5 MG/3ML; MG/3ML
1 SOLUTION RESPIRATORY (INHALATION) 2 TIMES DAILY
Status: DISCONTINUED | OUTPATIENT
Start: 2018-11-19 | End: 2018-11-18

## 2018-11-18 RX ORDER — IPRATROPIUM BROMIDE AND ALBUTEROL SULFATE 2.5; .5 MG/3ML; MG/3ML
1 SOLUTION RESPIRATORY (INHALATION) EVERY 4 HOURS PRN
Status: DISCONTINUED | OUTPATIENT
Start: 2018-11-18 | End: 2018-11-28 | Stop reason: HOSPADM

## 2018-11-18 RX ORDER — POTASSIUM CHLORIDE 7.45 MG/ML
10 INJECTION INTRAVENOUS PRN
Status: DISCONTINUED | OUTPATIENT
Start: 2018-11-18 | End: 2018-11-28 | Stop reason: HOSPADM

## 2018-11-18 RX ORDER — IBUPROFEN 800 MG/1
800 TABLET ORAL EVERY 6 HOURS PRN
Status: DISCONTINUED | OUTPATIENT
Start: 2018-11-18 | End: 2018-11-20 | Stop reason: ALTCHOICE

## 2018-11-18 RX ORDER — ONDANSETRON 4 MG/1
4 TABLET, ORALLY DISINTEGRATING ORAL EVERY 8 HOURS PRN
Status: DISCONTINUED | OUTPATIENT
Start: 2018-11-18 | End: 2018-11-28 | Stop reason: HOSPADM

## 2018-11-18 RX ORDER — ONDANSETRON 2 MG/ML
4 INJECTION INTRAMUSCULAR; INTRAVENOUS EVERY 30 MIN PRN
Status: DISCONTINUED | OUTPATIENT
Start: 2018-11-18 | End: 2018-11-19

## 2018-11-18 RX ORDER — IPRATROPIUM BROMIDE AND ALBUTEROL SULFATE 2.5; .5 MG/3ML; MG/3ML
1 SOLUTION RESPIRATORY (INHALATION) 2 TIMES DAILY
Status: DISCONTINUED | OUTPATIENT
Start: 2018-11-18 | End: 2018-11-27

## 2018-11-18 RX ORDER — PROMETHAZINE HYDROCHLORIDE 25 MG/ML
12.5 INJECTION, SOLUTION INTRAMUSCULAR; INTRAVENOUS EVERY 6 HOURS PRN
Status: DISCONTINUED | OUTPATIENT
Start: 2018-11-18 | End: 2018-11-28 | Stop reason: HOSPADM

## 2018-11-18 RX ADMIN — IPRATROPIUM BROMIDE AND ALBUTEROL SULFATE 1 AMPULE: .5; 3 SOLUTION RESPIRATORY (INHALATION) at 10:45

## 2018-11-18 RX ADMIN — ALPRAZOLAM 2 MG: 0.5 TABLET ORAL at 16:33

## 2018-11-18 RX ADMIN — OXYCODONE HYDROCHLORIDE AND ACETAMINOPHEN 1 TABLET: 10; 325 TABLET ORAL at 16:33

## 2018-11-18 RX ADMIN — LEVOFLOXACIN 750 MG: 750 TABLET, FILM COATED ORAL at 13:01

## 2018-11-18 RX ADMIN — POTASSIUM BICARBONATE 20 MEQ: 782 TABLET, EFFERVESCENT ORAL at 20:05

## 2018-11-18 RX ADMIN — GABAPENTIN 600 MG: 300 CAPSULE ORAL at 23:53

## 2018-11-18 RX ADMIN — GUAIFENESIN AND CODEINE PHOSPHATE 5 ML: 10; 100 LIQUID ORAL at 21:56

## 2018-11-18 RX ADMIN — IOPAMIDOL 75 ML: 755 INJECTION, SOLUTION INTRAVENOUS at 12:10

## 2018-11-18 RX ADMIN — Medication 10 ML: at 20:05

## 2018-11-18 RX ADMIN — CEFTRIAXONE SODIUM 1 G: 10 INJECTION, POWDER, FOR SOLUTION INTRAVENOUS at 16:16

## 2018-11-18 RX ADMIN — OXYCODONE HYDROCHLORIDE 5 MG: 5 TABLET ORAL at 13:01

## 2018-11-18 RX ADMIN — OXYCODONE HYDROCHLORIDE AND ACETAMINOPHEN 1 TABLET: 10; 325 TABLET ORAL at 20:04

## 2018-11-18 RX ADMIN — LIDOCAINE AND PRILOCAINE: 25; 25 CREAM TOPICAL at 18:10

## 2018-11-18 RX ADMIN — IBUPROFEN 800 MG: 800 TABLET ORAL at 13:14

## 2018-11-18 RX ADMIN — SODIUM CHLORIDE: 9 INJECTION, SOLUTION INTRAVENOUS at 20:01

## 2018-11-18 RX ADMIN — SODIUM CHLORIDE 2367 ML: 9 INJECTION, SOLUTION INTRAVENOUS at 15:57

## 2018-11-18 RX ADMIN — ENOXAPARIN SODIUM 40 MG: 40 INJECTION SUBCUTANEOUS at 20:05

## 2018-11-18 RX ADMIN — ONDANSETRON 4 MG: 2 INJECTION INTRAMUSCULAR; INTRAVENOUS at 10:50

## 2018-11-18 RX ADMIN — ONDANSETRON 4 MG: 2 INJECTION INTRAMUSCULAR; INTRAVENOUS at 16:33

## 2018-11-18 RX ADMIN — PROMETHAZINE HYDROCHLORIDE 12.5 MG: 25 INJECTION INTRAMUSCULAR; INTRAVENOUS at 23:02

## 2018-11-18 RX ADMIN — HYDROMORPHONE HYDROCHLORIDE 0.5 MG: 2 INJECTION INTRAMUSCULAR; INTRAVENOUS; SUBCUTANEOUS at 10:50

## 2018-11-18 RX ADMIN — SODIUM CHLORIDE 1000 ML: 9 INJECTION, SOLUTION INTRAVENOUS at 10:51

## 2018-11-18 RX ADMIN — IPRATROPIUM BROMIDE AND ALBUTEROL SULFATE 1 AMPULE: .5; 3 SOLUTION RESPIRATORY (INHALATION) at 16:35

## 2018-11-18 RX ADMIN — ONDANSETRON 4 MG: 2 INJECTION INTRAMUSCULAR; INTRAVENOUS at 21:53

## 2018-11-18 RX ADMIN — SUMATRIPTAN SUCCINATE 100 MG: 25 TABLET ORAL at 20:53

## 2018-11-18 RX ADMIN — ALPRAZOLAM 2 MG: 0.5 TABLET ORAL at 23:56

## 2018-11-18 RX ADMIN — IBUPROFEN 800 MG: 800 TABLET, FILM COATED ORAL at 13:14

## 2018-11-18 ASSESSMENT — PAIN SCALES - GENERAL
PAINLEVEL_OUTOF10: 3
PAINLEVEL_OUTOF10: 2
PAINLEVEL_OUTOF10: 9
PAINLEVEL_OUTOF10: 8
PAINLEVEL_OUTOF10: 7
PAINLEVEL_OUTOF10: 8
PAINLEVEL_OUTOF10: 8
PAINLEVEL_OUTOF10: 6
PAINLEVEL_OUTOF10: 8

## 2018-11-18 NOTE — ED PROVIDER NOTES
[address] [facility]  eMERGENCY dEPARTMENT eNCOUnter      Pt Name: Nakul Chaney  AGT:[id number]  [place] 1955  Date of evaluation: 2018  Provider: TANNER Mcghee   Attending provider:Zach Lira MD      Chief Complaint:    Chief Complaint   Patient presents with    Cough     Patient reporting cough, congestion and abdominal pain for the past 2 weeks. Also reporting a fever. Reports she has a history of CLL. Nursing Notes, Past Medical Hx, Past Surgical Hx, Social Hx, Allergies, and Family Hx were all reviewed and agreed with or any disagreements were addressed in the HPI.    HPI:  (Location, Duration, Timing, Severity,Quality, Assoc Sx, Context, Modifying factors)  This is a  66 y.o. female who presents here to the emergency department, the patient states that she has a history of CLL, and has chronic abdominal pain however she's been sick for the past 2 weeks with fevers and chills, and cough. She sees Dr. No Huitron. She denies any actual central chest pain, she does admit to fevers T-max of 101.5. Nothing seems to make her feel completely better, she does state that she is tired of the coughing. PastMedical/Surgical History:      Diagnosis Date    Allergic     Anxiety     Back pain     Cancer ([facility] [address]     lymphoma    Depression     Glaucoma     Hyperlipidemia     Hypertension          Procedure Laterality Date    APPENDECTOMY       SECTION      COLONOSCOPY      ERCP N/A 09/15/2017    HYSTERECTOMY      KNEE ARTHROSCOPY      right    TUNNELED VENOUS PORT PLACEMENT Right 10/10/2017    Power Port insertion with Sharan BORRERO in [facility] [place] [address]  2017       Medications:  Previous Medications    ALPRAZOLAM (XANAX) 2 MG TABLET    Take 2 mg by mouth 3 times daily as needed for Sleep. AMLODIPINE (NORVASC) 10 MG TABLET    Take 10 mg by mouth daily.     DOCUSATE SODIUM (COLACE) 100 MG

## 2018-11-18 NOTE — LETTER
· To find a different doctor, visit Medicare's Physician Compare website, HDTapes.co.nz, or call 1-800-MEDICARE (010 5472). TTY users should call 6-505.939.3457. · To find a different skilled nursing facility, visit [facility] Med[facility] website, [email or web address].Eterniam/, or call 1-800-MEDICARE (1- 467.341.5184). TTY users should call 0-459.839.9822. · To find a different long term care hospital, visit [facility] Compare website, SmREVSharelogy.be, or call 1-800- MEDICARE (333 5178). TTY users should call 9-993.242.8695. · To find a different inpatient rehabilitation facility, visit [facility] Compare website, www.medicare.gov/ inpatientrehabilitation facilitycompare, or call 1-800-MEDICARE (8-108.938.8856). TTY users should call 5- 189.647.3774. · To find a different home health agency, visit [address] website, www.medicare.gov/homehealthcompare, or call 1-800-MEDICARE (7-244- 734-7991). TTY users should call 6-787.635.6096.

## 2018-11-18 NOTE — H&P
History and Physical  Dr. Ham Gains  11/18/2018    PCP: Yarelis New MD    Cc:   Chief Complaint   Patient presents with    Cough     Patient reporting cough, congestion and abdominal pain for the past 2 weeks. Also reporting a fever. Reports she has a history of CLL. HPI:  Boaz Amos is a 66 y.o.  female who has a past medical history of Allergic; Anxiety; Back pain; Cancer ([address] Depression; Glaucoma; Hyperlipidemia; and Hypertension. Patient presents with PNA (pneumonia). HPI of presenting complaint in blockformat: (1-3 for expanded problem focused, ?4 for detailed/comprehensive)   Location: chest congestion and cough  Duration: about 2 wks  Timing:    Context: 66 y.o. female who presents here to the emergency department, the patient states that she has a history of CLL, and has chronic abdominal pain however she's been sick for the past 2 weeks with fevers and chills, and cough. She sees Dr. Sandra Garduno. She denies any actual central chest pain, she does admit to fevers T-max of 101.5. Nothing seems to make her feel completely better, she does state that she is tired of the coughing. Severity: moderately severe  Quality:    Modifying Factors: worse with movement,   Associated Signs or Symptoms: +fevers. +chest congestion. +sob. +cough/sputum         Problem list of hospitalization thus far: Active Hospital Problems    Diagnosis    PNA (pneumonia) [J18.9]    CLL (chronic lymphocytic leukemia) (HCC) [C91.90]    HTN (hypertension) [I10]    HLD (hyperlipidemia) [E78.5]    GERD (gastroesophageal reflux disease) [K21.9]         Review of Systems: (1 system for EPF, 2-9 for detailed, 10+ for comprehensive)  Review of Systems   Constitutional: Positive for fever. Negative for chills. HENT: Negative for hearing loss and mouth sores. Eyes: Negative for discharge and itching. Respiratory: Positive for cough, chest tightness and shortness of breath.     Cardiovascular: Negative for chest pain and leg swelling. Gastrointestinal: Negative for constipation, diarrhea and vomiting. Endocrine: Negative for polydipsia and polyphagia. Genitourinary: Negative for frequency and urgency. Musculoskeletal: Positive for back pain. Negative for gait problem and joint swelling. Skin: Negative for pallor. Allergic/Immunologic: Negative for environmental allergies. Neurological: Negative for seizures and light-headedness. Psychiatric/Behavioral: Negative for decreased concentration. The patient is nervous/anxious. Past Medical History:   Past Medical History:   Diagnosis Date    Allergic     Anxiety     Back pain     Cancer ([facility] [address]     lymphoma    Depression     Glaucoma     Hyperlipidemia     Hypertension        Past Surgical History:   Past Surgical History:   Procedure Laterality Date    APPENDECTOMY       SECTION      COLONOSCOPY      ERCP N/A 09/15/2017    HYSTERECTOMY      KNEE ARTHROSCOPY      right    TUNNELED VENOUS PORT PLACEMENT Right 10/10/2017    Power Port insertion with Evens Mays MD in [facility] [place] [address]  2017       Social History:   Social History     Social History    Marital status:      Spouse name: N/A    Number of children: N/A    Years of education: N/A     Occupational History    Not on file. Social History Main Topics    Smoking status: Current Every Day Smoker     Packs/day: 1.00    Smokeless tobacco: Never Used    Alcohol use No    Drug use: No    Sexual activity: Not on file     Other Topics Concern    Not on file     Social History Narrative    No narrative on file       Fam History:   Family History   Problem Relation Age of Onset    Stroke Mother     Depression Father     High Cholesterol Father     Migraines Maternal Aunt        PFSH: The above PMHx, PSHx, SocHx, FamHx has been reviewed by myself.  (1 area for detailed, 2-3 for comprehensive)      Code Status: Prior    Meds - following list of home medications fromelectronic chart has been reviewed by myself  No current facility-administered medications on file prior to encounter. Current Outpatient Prescriptions on File Prior to Encounter   Medication Sig Dispense Refill    potassium chloride (KLOR-CON) 20 MEQ packet Take 20 mEq by mouth 2 times daily      furosemide (LASIX) 20 MG tablet Take 20 mg by mouth 2 times daily      ondansetron (ZOFRAN) 4 MG tablet Take 4 mg by mouth every 8 hours as needed for Nausea or Vomiting      docusate sodium (COLACE) 100 MG capsule Take 1 capsule by mouth daily 30 capsule 2    omeprazole (PRILOSEC) 40 MG capsule Take 40 mg by mouth daily.  ALPRAZolam (XANAX) 2 MG tablet Take 2 mg by mouth 3 times daily as needed for Sleep.  amLODIPine (NORVASC) 10 MG tablet Take 10 mg by mouth daily.  pravastatin (PRAVACHOL) 20 MG tablet Take 20 mg by mouth daily.  FLUoxetine (PROZAC) 20 MG capsule Take 60 mg by mouth daily.  SUMAtriptan (IMITREX) 25 MG tablet Take 100 mg by mouth once as needed for Migraine            Allergies   Allergen Reactions    Toradol [Ketorolac Tromethamine] Anaphylaxis and Nausea Only     \"doesn't help anyway. \"     Acetaminophen     Haldol [Haloperidol Lactate]     Morphine      \"just doesn't work\"    Other     Prochlorperazine Maleate     Sulfamethoxazole-Trimethoprim Itching    Sumatriptan Other (See Comments)     From 2/25/2008 home med. Reconciliation record. No reaction noted.   This entry to replace \"other-some other migraine meds\"    Trazodone Other (See Comments)     Nightmares    Vistaril [Hydroxyzine Hcl]     Biaxin [Clarithromycin] Nausea And Vomiting    Butorphanol Rash    Tramadol Rash             EXAM: (2-7 system for EPF/Detailed, ?8 for Comprehensive)  /66   Pulse 110   Temp 103.3 °F (39.6 °C) (Infrared)   Resp 20   Ht 5' 2\" (1.575 m)   Wt 174 lb (78.9 kg)   SpO2 94%   BMI 31.83 kg/m²   Constitutional: vitals as Exam: cough Acuity: Unknown Type of Exam: Unknown; ORDERING SYSTEM PROVIDED HISTORY: CLL evaluation per Dr Flavio Mullins TECHNOLOGIST PROVIDED HISTORY: If patient is on cardiac monitor and/or pulse ox, they may be taken off cardiac monitor and pulse ox, left on O2 if currently on. All monitors reattached when patient returns to room. Additional Contrast?->None Ordering Physician Provided Reason for Exam: cough Acuity: Unknown Type of Exam: Unknown FINDINGS: Chest: Mediastinum: Calcified subcarinal and left hilar lymph nodes are again seen. There are mildly prominent right paratracheal, AP window, subcarinal, and bilateral hilar noncalcified lymph nodes present which are either unchanged or slightly more prominent compared to July 2018. Central pulmonary arteries are unremarkable. No acute finding within the thoracic aorta. Coronary artery calcifications are present. No pericardial effusion. Lungs/pleura: Moderate to severe airspace opacity with some peripheral consolidation, volume loss, and air bronchograms within the right middle lobe. This is new over the past 4 months. In addition, there are smaller subsegmental patchy areas of airspace opacity and ground-glass opacity noted in all segments of both lungs compatible with multifocal pneumonia. No pleural effusion or pneumothorax. Central airways are patent. Soft Tissues/Bones: No acute finding. Multilevel degenerative disc changes seen within the thoracic spine. Abdomen/Pelvis: Organs: Liver, spleen, pancreas, adrenal glands are unremarkable. 2 or 3 small cortical cyst noted within the right kidney which are unchanged. Kidneys otherwise unremarkable. No hydronephrosis or perinephric stranding. No radiopaque stone seen in either kidney or ureter. GI/Bowel: Stomach and small bowel are unremarkable. Appendix is surgically absent. Colon is unremarkable. Pelvis: Surgical absence of uterus. No suspicious pelvic or adnexal mass or fluid collection.   Urinary miriam aware of admission          Diagnoses as listed above, designated as new or established and plan outlined for each. Data Reviewed:   (1) Lab tests were reviewed or ordered. (1) Radiology tests were reviewed or ordered. (1) Medical test (Echo, EKG, PFT/stanley) were not ordered. (1)History was obtained from someone other than patient  (1) Old records  were not reviewed - see HPI/MDM for pertinent details if review done. (2) Case wasdiscussed with another health care provider: Dr Steph Jiménez  (2) Imaging was viewed by myself. cxr  (2) EKG  was viewed by myself. The patient isbeing placed in inpatient status with the expectation of requiring a hospital stay spanning at least two midnights for care and treatment of the problems noted in the problem list.  This determination is also based on thepatients comorbidities and past medical history, the severity and timing of the signs and symptoms upon presentation.         Electronically signed by: Gisselle Jimenez 11/18/2018

## 2018-11-19 ENCOUNTER — APPOINTMENT (OUTPATIENT)
Dept: GENERAL RADIOLOGY | Age: 63
DRG: 853 | End: 2018-11-19
Payer: MEDICARE

## 2018-11-19 LAB
ANION GAP SERPL CALCULATED.3IONS-SCNC: 11 MMOL/L (ref 3–16)
BASOPHILS ABSOLUTE: 0.1 K/UL (ref 0–0.2)
BASOPHILS RELATIVE PERCENT: 0.6 %
BUN BLDV-MCNC: 10 MG/DL (ref 7–20)
CALCIUM SERPL-MCNC: 8.2 MG/DL (ref 8.3–10.6)
CHLORIDE BLD-SCNC: 107 MMOL/L (ref 99–110)
CO2: 24 MMOL/L (ref 21–32)
CREAT SERPL-MCNC: 0.8 MG/DL (ref 0.6–1.2)
EOSINOPHILS ABSOLUTE: 0 K/UL (ref 0–0.6)
EOSINOPHILS RELATIVE PERCENT: 0 %
GFR AFRICAN AMERICAN: >60
GFR NON-AFRICAN AMERICAN: >60
GLUCOSE BLD-MCNC: 95 MG/DL (ref 70–99)
HCT VFR BLD CALC: 33.9 % (ref 36–48)
HEMOGLOBIN: 11.4 G/DL (ref 12–16)
LYMPHOCYTES ABSOLUTE: 0.9 K/UL (ref 1–5.1)
LYMPHOCYTES RELATIVE PERCENT: 9.2 %
MCH RBC QN AUTO: 32.1 PG (ref 26–34)
MCHC RBC AUTO-ENTMCNC: 33.6 G/DL (ref 31–36)
MCV RBC AUTO: 95.5 FL (ref 80–100)
MONOCYTES ABSOLUTE: 0.5 K/UL (ref 0–1.3)
MONOCYTES RELATIVE PERCENT: 5.3 %
NEUTROPHILS ABSOLUTE: 8.7 K/UL (ref 1.7–7.7)
NEUTROPHILS RELATIVE PERCENT: 84.9 %
PDW BLD-RTO: 13.6 % (ref 12.4–15.4)
PLATELET # BLD: 392 K/UL (ref 135–450)
PMV BLD AUTO: 8.1 FL (ref 5–10.5)
POTASSIUM SERPL-SCNC: 3.3 MMOL/L (ref 3.5–5.1)
RBC # BLD: 3.55 M/UL (ref 4–5.2)
SODIUM BLD-SCNC: 142 MMOL/L (ref 136–145)
WBC # BLD: 10.2 K/UL (ref 4–11)

## 2018-11-19 PROCEDURE — 6370000000 HC RX 637 (ALT 250 FOR IP): Performed by: PHYSICIAN ASSISTANT

## 2018-11-19 PROCEDURE — 6360000002 HC RX W HCPCS: Performed by: INTERNAL MEDICINE

## 2018-11-19 PROCEDURE — 2700000000 HC OXYGEN THERAPY PER DAY

## 2018-11-19 PROCEDURE — 6370000000 HC RX 637 (ALT 250 FOR IP): Performed by: INTERNAL MEDICINE

## 2018-11-19 PROCEDURE — 36591 DRAW BLOOD OFF VENOUS DEVICE: CPT

## 2018-11-19 PROCEDURE — 1200000000 HC SEMI PRIVATE

## 2018-11-19 PROCEDURE — 94760 N-INVAS EAR/PLS OXIMETRY 1: CPT

## 2018-11-19 PROCEDURE — 94640 AIRWAY INHALATION TREATMENT: CPT

## 2018-11-19 PROCEDURE — 80048 BASIC METABOLIC PNL TOTAL CA: CPT

## 2018-11-19 PROCEDURE — 85025 COMPLETE CBC W/AUTO DIFF WBC: CPT

## 2018-11-19 PROCEDURE — 71046 X-RAY EXAM CHEST 2 VIEWS: CPT

## 2018-11-19 PROCEDURE — 2580000003 HC RX 258: Performed by: INTERNAL MEDICINE

## 2018-11-19 RX ADMIN — PROMETHAZINE HYDROCHLORIDE 12.5 MG: 25 INJECTION INTRAMUSCULAR; INTRAVENOUS at 23:45

## 2018-11-19 RX ADMIN — FLUOXETINE HYDROCHLORIDE 60 MG: 20 CAPSULE ORAL at 09:14

## 2018-11-19 RX ADMIN — Medication 10 ML: at 20:15

## 2018-11-19 RX ADMIN — OXYCODONE HYDROCHLORIDE AND ACETAMINOPHEN 1 TABLET: 10; 325 TABLET ORAL at 20:13

## 2018-11-19 RX ADMIN — GUAIFENESIN AND CODEINE PHOSPHATE 5 ML: 10; 100 LIQUID ORAL at 20:13

## 2018-11-19 RX ADMIN — ONDANSETRON 4 MG: 2 INJECTION INTRAMUSCULAR; INTRAVENOUS at 22:00

## 2018-11-19 RX ADMIN — LEVOFLOXACIN 750 MG: 750 TABLET, FILM COATED ORAL at 09:13

## 2018-11-19 RX ADMIN — ACETAMINOPHEN 650 MG: 325 TABLET, FILM COATED ORAL at 02:11

## 2018-11-19 RX ADMIN — IBUPROFEN 800 MG: 800 TABLET ORAL at 05:37

## 2018-11-19 RX ADMIN — AMLODIPINE BESYLATE 10 MG: 5 TABLET ORAL at 09:14

## 2018-11-19 RX ADMIN — GUAIFENESIN AND CODEINE PHOSPHATE 5 ML: 10; 100 LIQUID ORAL at 09:18

## 2018-11-19 RX ADMIN — PROMETHAZINE HYDROCHLORIDE 12.5 MG: 25 INJECTION INTRAMUSCULAR; INTRAVENOUS at 17:48

## 2018-11-19 RX ADMIN — OXYCODONE HYDROCHLORIDE AND ACETAMINOPHEN 1 TABLET: 10; 325 TABLET ORAL at 09:14

## 2018-11-19 RX ADMIN — Medication 10 ML: at 11:40

## 2018-11-19 RX ADMIN — OXYCODONE HYDROCHLORIDE AND ACETAMINOPHEN 1 TABLET: 10; 325 TABLET ORAL at 13:50

## 2018-11-19 RX ADMIN — GUAIFENESIN AND CODEINE PHOSPHATE 5 ML: 10; 100 LIQUID ORAL at 15:48

## 2018-11-19 RX ADMIN — ALPRAZOLAM 2 MG: 0.5 TABLET ORAL at 18:46

## 2018-11-19 RX ADMIN — POTASSIUM BICARBONATE 20 MEQ: 782 TABLET, EFFERVESCENT ORAL at 20:12

## 2018-11-19 RX ADMIN — SODIUM CHLORIDE: 9 INJECTION, SOLUTION INTRAVENOUS at 09:08

## 2018-11-19 RX ADMIN — PRAVASTATIN SODIUM 20 MG: 20 TABLET ORAL at 09:12

## 2018-11-19 RX ADMIN — ALPRAZOLAM 2 MG: 0.5 TABLET ORAL at 09:20

## 2018-11-19 RX ADMIN — IPRATROPIUM BROMIDE AND ALBUTEROL SULFATE 1 AMPULE: .5; 3 SOLUTION RESPIRATORY (INHALATION) at 08:33

## 2018-11-19 RX ADMIN — PROMETHAZINE HYDROCHLORIDE 12.5 MG: 25 INJECTION INTRAMUSCULAR; INTRAVENOUS at 05:34

## 2018-11-19 RX ADMIN — GUAIFENESIN AND CODEINE PHOSPHATE 5 ML: 10; 100 LIQUID ORAL at 02:10

## 2018-11-19 RX ADMIN — ACETAMINOPHEN 650 MG: 325 TABLET, FILM COATED ORAL at 23:46

## 2018-11-19 RX ADMIN — GABAPENTIN 600 MG: 300 CAPSULE ORAL at 20:13

## 2018-11-19 RX ADMIN — POTASSIUM CHLORIDE 40 MEQ: 1500 TABLET, EXTENDED RELEASE ORAL at 09:15

## 2018-11-19 RX ADMIN — SODIUM CHLORIDE: 9 INJECTION, SOLUTION INTRAVENOUS at 20:17

## 2018-11-19 RX ADMIN — PROMETHAZINE HYDROCHLORIDE 12.5 MG: 25 INJECTION INTRAMUSCULAR; INTRAVENOUS at 11:39

## 2018-11-19 RX ADMIN — ENOXAPARIN SODIUM 40 MG: 40 INJECTION SUBCUTANEOUS at 20:13

## 2018-11-19 RX ADMIN — Medication 10 ML: at 09:08

## 2018-11-19 RX ADMIN — IPRATROPIUM BROMIDE AND ALBUTEROL SULFATE 1 AMPULE: .5; 3 SOLUTION RESPIRATORY (INHALATION) at 20:50

## 2018-11-19 RX ADMIN — GABAPENTIN 600 MG: 300 CAPSULE ORAL at 13:50

## 2018-11-19 RX ADMIN — GABAPENTIN 600 MG: 300 CAPSULE ORAL at 09:13

## 2018-11-19 RX ADMIN — PANTOPRAZOLE SODIUM 40 MG: 40 TABLET, DELAYED RELEASE ORAL at 05:20

## 2018-11-19 RX ADMIN — OXYCODONE HYDROCHLORIDE AND ACETAMINOPHEN 1 TABLET: 10; 325 TABLET ORAL at 17:36

## 2018-11-19 ASSESSMENT — PAIN DESCRIPTION - PAIN TYPE
TYPE: CHRONIC PAIN

## 2018-11-19 ASSESSMENT — PAIN SCALES - GENERAL
PAINLEVEL_OUTOF10: 0
PAINLEVEL_OUTOF10: 8
PAINLEVEL_OUTOF10: 7
PAINLEVEL_OUTOF10: 7
PAINLEVEL_OUTOF10: 8
PAINLEVEL_OUTOF10: 7
PAINLEVEL_OUTOF10: 8
PAINLEVEL_OUTOF10: 3
PAINLEVEL_OUTOF10: 8
PAINLEVEL_OUTOF10: 8
PAINLEVEL_OUTOF10: 6
PAINLEVEL_OUTOF10: 2
PAINLEVEL_OUTOF10: 7

## 2018-11-19 ASSESSMENT — PAIN DESCRIPTION - PROGRESSION
CLINICAL_PROGRESSION: GRADUALLY IMPROVING

## 2018-11-19 ASSESSMENT — ENCOUNTER SYMPTOMS
EYE DISCHARGE: 0
VOMITING: 0
DIARRHEA: 0
SHORTNESS OF BREATH: 1
COUGH: 1
EYE ITCHING: 0
BACK PAIN: 1
CONSTIPATION: 0
CHEST TIGHTNESS: 1

## 2018-11-19 ASSESSMENT — PAIN DESCRIPTION - LOCATION
LOCATION: ABDOMEN

## 2018-11-19 ASSESSMENT — PAIN DESCRIPTION - ORIENTATION
ORIENTATION: MID

## 2018-11-19 ASSESSMENT — PAIN DESCRIPTION - FREQUENCY
FREQUENCY: CONTINUOUS

## 2018-11-19 ASSESSMENT — PAIN SCALES - WONG BAKER
WONGBAKER_NUMERICALRESPONSE: 0

## 2018-11-19 NOTE — PROGRESS NOTES
chest was performed with the administration of intravenous contrast. Multiplanar reformatted images are provided for review. Dose modulation, iterative reconstruction, and/or weight based adjustment of the mA/kV was utilized to reduce the radiation dose to as low as reasonably achievable.; CT of the abdomen and pelvis was performed with the administration of intravenous contrast. Multiplanar reformatted images are provided for review. Dose modulation, iterative reconstruction, and/or weight based adjustment of the mA/kV was utilized to reduce the radiation dose to as low as reasonably achievable. COMPARISON: CT scan of the chest, abdomen, and pelvis July 10, 2018. HISTORY: ORDERING SYSTEM PROVIDED HISTORY: CLL evaluation per dr Ann Pope TECHNOLOGIST PROVIDED HISTORY: Ordering Physician Provided Reason for Exam: cough Acuity: Unknown Type of Exam: Unknown; ORDERING SYSTEM PROVIDED HISTORY: CLL evaluation per Dr Ann Pope TECHNOLOGIST PROVIDED HISTORY: If patient is on cardiac monitor and/or pulse ox, they may be taken off cardiac monitor and pulse ox, left on O2 if currently on. All monitors reattached when patient returns to room. Additional Contrast?->None Ordering Physician Provided Reason for Exam: cough Acuity: Unknown Type of Exam: Unknown FINDINGS: Chest: Mediastinum: Calcified subcarinal and left hilar lymph nodes are again seen. There are mildly prominent right paratracheal, AP window, subcarinal, and bilateral hilar noncalcified lymph nodes present which are either unchanged or slightly more prominent compared to July 2018. Central pulmonary arteries are unremarkable. No acute finding within the thoracic aorta. Coronary artery calcifications are present. No pericardial effusion. Lungs/pleura: Moderate to severe airspace opacity with some peripheral consolidation, volume loss, and air bronchograms within the right middle lobe. This is new over the past 4 months.   In addition, there are smaller subsegmental patchy areas of airspace opacity and ground-glass opacity noted in all segments of both lungs compatible with multifocal pneumonia. No pleural effusion or pneumothorax. Central airways are patent. Soft Tissues/Bones: No acute finding. Multilevel degenerative disc changes seen within the thoracic spine. Abdomen/Pelvis: Organs: Liver, spleen, pancreas, adrenal glands are unremarkable. 2 or 3 small cortical cyst noted within the right kidney which are unchanged. Kidneys otherwise unremarkable. No hydronephrosis or perinephric stranding. No radiopaque stone seen in either kidney or ureter. GI/Bowel: Stomach and small bowel are unremarkable. Appendix is surgically absent. Colon is unremarkable. Pelvis: Surgical absence of uterus. No suspicious pelvic or adnexal mass or fluid collection. Urinary bladder is unremarkable. Peritoneum/Retroperitoneum: There are a few small subcentimeter lymph nodes within the retroperitoneal space-unchanged from prior exam. Bones/Soft Tissues: No acute findings. Multilevel degenerative disc changes and facet arthropathy is noted. Moderate right middle lobe pneumonia with some volume loss and consolidation. Mild multifocal airspace opacity noted throughout all remaining segments of both lungs compatible with multifocal pneumonia. Mildly prominent mediastinal and hilar lymphadenopathy either unchanged or slightly more prominent compared to July 2018. These likely represent reactive lymph nodes from suspected multifocal pneumonia or sequela from chronic lymphocytic leukemia. No acute intra-abdominal or pelvic finding. Stable mildly prominent retroperitoneal lymph nodes-likely sequela of CLL. Assessment and Plan:  Patient Active Hospital Problem List:   PNA (pneumonia) (11/18/2018)    Assessment: Established problem. Improving. Pt states she feels  A little better. Still with supplemental o2 requirement - does not normally wear o2    Plan: cont on iv abx. GERD (gastroesophageal reflux disease) (8/15/2017)    Assessment: Established problem. Stable. Plan: Continue present orders/plan. HTN (hypertension) (8/15/2017)    Assessment: Established problem. Stable. 130/75    Plan: bp controlled; stay on same meds   HLD (hyperlipidemia) (8/15/2017)    Assessment: Established problem. Stable. Plan: Continue present orders/plan. CLL (chronic lymphocytic leukemia) ([facility] [address] (8/24/2017)    Assessment: Established problem. Stable.      Plan: [facility] notified of admit              Korin Mota  11/19/2018

## 2018-11-19 NOTE — PROGRESS NOTES
Bedside handoff complete with MICHELLE Jolly. Bolus still infusing. Pt denies any further needs. Call light in reach. Will continue to monitor.

## 2018-11-19 NOTE — PROGRESS NOTES
Head to toe assessment complete. Vital signs obtained. Bed linens changed. Pt assisted back to bed. AM medication administered. Pt tolerated well. Pt requesting pain medication. Medication administered per order. Pt tolerated well. Denies further needs at this time. Call light in hand. Bed alarm set. Will continue to monitor.

## 2018-11-19 NOTE — PROGRESS NOTES
Pt had episode of emesis. PRN Zofran given. Gown and sheets changed. PRN cough syrup given. Pts temp 100.3. Will continue to monitor.

## 2018-11-19 NOTE — CONSULTS
[facility]   Progress Note      11/19/2018 9:16 AM        Name: Arnulfo Coleman . Admitted: 11/18/2018    HPI:  Arnulfo Coleman is a patient of Dr. Maninder Roach with CLL. She was treated with 6 cycles of obinutuzamab/chlorambucil in Oct 2017, she has been on observation since then. She has chronic pain, sees Dr. Sally Quiñonez. She was admitted with fever, cough, chest congestion and found to have PNA, she has been started on IV levofloxacin. SUBJECTIVE:  She continues to have SOB, reports feeling somewhat better.      Reviewed interval ancillary notes    Current Medications    levofloxacin (LEVAQUIN) tablet 750 mg Daily   ibuprofen (ADVIL;MOTRIN) tablet 800 mg Q6H PRN   ALPRAZolam (XANAX) tablet 2 mg TID PRN   amLODIPine (NORVASC) tablet 10 mg Daily   pravastatin (PRAVACHOL) tablet 20 mg Daily   FLUoxetine (PROZAC) capsule 60 mg Daily   docusate sodium (COLACE) capsule 100 mg Daily   ondansetron (ZOFRAN-ODT) disintegrating tablet 4 mg Q8H PRN   potassium bicarb-citric acid (EFFER-K) effervescent tablet 20 mEq BID   oxyCODONE-acetaminophen (PERCOCET)  MG per tablet 1 tablet 4x Daily   0.9 % sodium chloride infusion Continuous   sodium chloride flush 0.9 % injection 10 mL 2 times per day   sodium chloride flush 0.9 % injection 10 mL PRN   magnesium hydroxide (MILK OF MAGNESIA) 400 MG/5ML suspension 30 mL Daily PRN   ondansetron (ZOFRAN) injection 4 mg Q6H PRN   enoxaparin (LOVENOX) injection 40 mg Nightly   levofloxacin (LEVAQUIN) 500 MG/100ML infusion 500 mg Q24H   potassium chloride (KLOR-CON M) extended release tablet 40 mEq PRN   Or    potassium bicarb-citric acid (EFFER-K) effervescent tablet 40 mEq PRN   Or    potassium chloride 10 mEq/100 mL IVPB (Peripheral Line) PRN   pantoprazole (PROTONIX) tablet 40 mg QAM AC   influenza quadrivalent split vaccine (FLUZONE;FLUARIX;FLULAVAL;AFLURIA) injection 0.5 mL Once   lidocaine-prilocaine (EMLA) cream PRN   ipratropium-albuterol

## 2018-11-19 NOTE — PROGRESS NOTES
Shift assessment complete. VSS. HS meds given. Pt denies any needs at this time. Call light in reach. Will continue to monitor.

## 2018-11-20 LAB
ANION GAP SERPL CALCULATED.3IONS-SCNC: 10 MMOL/L (ref 3–16)
BASOPHILS ABSOLUTE: 0 K/UL (ref 0–0.2)
BASOPHILS RELATIVE PERCENT: 0.2 %
BUN BLDV-MCNC: 12 MG/DL (ref 7–20)
CALCIUM SERPL-MCNC: 8.2 MG/DL (ref 8.3–10.6)
CHLORIDE BLD-SCNC: 107 MMOL/L (ref 99–110)
CO2: 24 MMOL/L (ref 21–32)
CREAT SERPL-MCNC: 0.7 MG/DL (ref 0.6–1.2)
EOSINOPHILS ABSOLUTE: 0 K/UL (ref 0–0.6)
EOSINOPHILS RELATIVE PERCENT: 0.7 %
GFR AFRICAN AMERICAN: >60
GFR NON-AFRICAN AMERICAN: >60
GLUCOSE BLD-MCNC: 95 MG/DL (ref 70–99)
HCT VFR BLD CALC: 29.2 % (ref 36–48)
HEMOGLOBIN: 9.7 G/DL (ref 12–16)
LYMPHOCYTES ABSOLUTE: 0.9 K/UL (ref 1–5.1)
LYMPHOCYTES RELATIVE PERCENT: 12.5 %
MCH RBC QN AUTO: 32.1 PG (ref 26–34)
MCHC RBC AUTO-ENTMCNC: 33.3 G/DL (ref 31–36)
MCV RBC AUTO: 96.5 FL (ref 80–100)
MONOCYTES ABSOLUTE: 0.5 K/UL (ref 0–1.3)
MONOCYTES RELATIVE PERCENT: 7.2 %
NEUTROPHILS ABSOLUTE: 5.6 K/UL (ref 1.7–7.7)
NEUTROPHILS RELATIVE PERCENT: 79.4 %
PDW BLD-RTO: 13.6 % (ref 12.4–15.4)
PLATELET # BLD: 311 K/UL (ref 135–450)
PMV BLD AUTO: 8.1 FL (ref 5–10.5)
POTASSIUM SERPL-SCNC: 4 MMOL/L (ref 3.5–5.1)
RBC # BLD: 3.03 M/UL (ref 4–5.2)
SODIUM BLD-SCNC: 141 MMOL/L (ref 136–145)
WBC # BLD: 7 K/UL (ref 4–11)

## 2018-11-20 PROCEDURE — 2580000003 HC RX 258: Performed by: INTERNAL MEDICINE

## 2018-11-20 PROCEDURE — 97116 GAIT TRAINING THERAPY: CPT

## 2018-11-20 PROCEDURE — 97535 SELF CARE MNGMENT TRAINING: CPT

## 2018-11-20 PROCEDURE — 97161 PT EVAL LOW COMPLEX 20 MIN: CPT

## 2018-11-20 PROCEDURE — G8978 MOBILITY CURRENT STATUS: HCPCS

## 2018-11-20 PROCEDURE — 6370000000 HC RX 637 (ALT 250 FOR IP): Performed by: INTERNAL MEDICINE

## 2018-11-20 PROCEDURE — 80048 BASIC METABOLIC PNL TOTAL CA: CPT

## 2018-11-20 PROCEDURE — G8988 SELF CARE GOAL STATUS: HCPCS

## 2018-11-20 PROCEDURE — 97165 OT EVAL LOW COMPLEX 30 MIN: CPT

## 2018-11-20 PROCEDURE — 97530 THERAPEUTIC ACTIVITIES: CPT

## 2018-11-20 PROCEDURE — 82784 ASSAY IGA/IGD/IGG/IGM EACH: CPT

## 2018-11-20 PROCEDURE — G8979 MOBILITY GOAL STATUS: HCPCS

## 2018-11-20 PROCEDURE — 2700000000 HC OXYGEN THERAPY PER DAY

## 2018-11-20 PROCEDURE — 1200000000 HC SEMI PRIVATE

## 2018-11-20 PROCEDURE — 36591 DRAW BLOOD OFF VENOUS DEVICE: CPT

## 2018-11-20 PROCEDURE — 85025 COMPLETE CBC W/AUTO DIFF WBC: CPT

## 2018-11-20 PROCEDURE — 6360000002 HC RX W HCPCS: Performed by: INTERNAL MEDICINE

## 2018-11-20 PROCEDURE — 94640 AIRWAY INHALATION TREATMENT: CPT

## 2018-11-20 PROCEDURE — G8987 SELF CARE CURRENT STATUS: HCPCS

## 2018-11-20 RX ORDER — OXYCODONE AND ACETAMINOPHEN 10; 325 MG/1; MG/1
1 TABLET ORAL EVERY 4 HOURS
Status: DISCONTINUED | OUTPATIENT
Start: 2018-11-20 | End: 2018-11-28 | Stop reason: HOSPADM

## 2018-11-20 RX ORDER — IBUPROFEN 600 MG/1
600 TABLET ORAL EVERY 6 HOURS PRN
Status: DISCONTINUED | OUTPATIENT
Start: 2018-11-20 | End: 2018-11-21 | Stop reason: ALTCHOICE

## 2018-11-20 RX ADMIN — IPRATROPIUM BROMIDE AND ALBUTEROL SULFATE 1 AMPULE: .5; 3 SOLUTION RESPIRATORY (INHALATION) at 18:15

## 2018-11-20 RX ADMIN — SODIUM CHLORIDE: 9 INJECTION, SOLUTION INTRAVENOUS at 08:44

## 2018-11-20 RX ADMIN — GUAIFENESIN AND CODEINE PHOSPHATE 5 ML: 10; 100 LIQUID ORAL at 14:36

## 2018-11-20 RX ADMIN — LEVOFLOXACIN 500 MG: 5 INJECTION, SOLUTION INTRAVENOUS at 08:42

## 2018-11-20 RX ADMIN — GUAIFENESIN AND CODEINE PHOSPHATE 5 ML: 10; 100 LIQUID ORAL at 19:30

## 2018-11-20 RX ADMIN — FUROSEMIDE 20 MG: 20 TABLET ORAL at 08:41

## 2018-11-20 RX ADMIN — OXYCODONE HYDROCHLORIDE AND ACETAMINOPHEN 1 TABLET: 10; 325 TABLET ORAL at 12:53

## 2018-11-20 RX ADMIN — GUAIFENESIN AND CODEINE PHOSPHATE 5 ML: 10; 100 LIQUID ORAL at 08:50

## 2018-11-20 RX ADMIN — GABAPENTIN 600 MG: 300 CAPSULE ORAL at 20:46

## 2018-11-20 RX ADMIN — SODIUM CHLORIDE: 9 INJECTION, SOLUTION INTRAVENOUS at 20:53

## 2018-11-20 RX ADMIN — DOCUSATE SODIUM 100 MG: 100 CAPSULE, LIQUID FILLED ORAL at 08:41

## 2018-11-20 RX ADMIN — GABAPENTIN 600 MG: 300 CAPSULE ORAL at 14:36

## 2018-11-20 RX ADMIN — POTASSIUM BICARBONATE 20 MEQ: 782 TABLET, EFFERVESCENT ORAL at 08:41

## 2018-11-20 RX ADMIN — ALPRAZOLAM 2 MG: 0.5 TABLET ORAL at 08:41

## 2018-11-20 RX ADMIN — OXYCODONE HYDROCHLORIDE AND ACETAMINOPHEN 1 TABLET: 10; 325 TABLET ORAL at 20:46

## 2018-11-20 RX ADMIN — GABAPENTIN 600 MG: 300 CAPSULE ORAL at 08:41

## 2018-11-20 RX ADMIN — OXYCODONE HYDROCHLORIDE AND ACETAMINOPHEN 1 TABLET: 10; 325 TABLET ORAL at 08:41

## 2018-11-20 RX ADMIN — OXYCODONE HYDROCHLORIDE AND ACETAMINOPHEN 1 TABLET: 10; 325 TABLET ORAL at 17:48

## 2018-11-20 RX ADMIN — Medication 10 ML: at 20:47

## 2018-11-20 RX ADMIN — ENOXAPARIN SODIUM 40 MG: 40 INJECTION SUBCUTANEOUS at 20:46

## 2018-11-20 RX ADMIN — IPRATROPIUM BROMIDE AND ALBUTEROL SULFATE 1 AMPULE: .5; 3 SOLUTION RESPIRATORY (INHALATION) at 08:31

## 2018-11-20 RX ADMIN — GUAIFENESIN AND CODEINE PHOSPHATE 5 ML: 10; 100 LIQUID ORAL at 00:40

## 2018-11-20 RX ADMIN — Medication 10 ML: at 08:41

## 2018-11-20 RX ADMIN — PRAVASTATIN SODIUM 20 MG: 20 TABLET ORAL at 08:41

## 2018-11-20 RX ADMIN — PROMETHAZINE HYDROCHLORIDE 12.5 MG: 25 INJECTION INTRAMUSCULAR; INTRAVENOUS at 16:17

## 2018-11-20 RX ADMIN — GUAIFENESIN AND CODEINE PHOSPHATE 5 ML: 10; 100 LIQUID ORAL at 05:01

## 2018-11-20 RX ADMIN — PANTOPRAZOLE SODIUM 40 MG: 40 TABLET, DELAYED RELEASE ORAL at 05:03

## 2018-11-20 RX ADMIN — PROMETHAZINE HYDROCHLORIDE 12.5 MG: 25 INJECTION INTRAMUSCULAR; INTRAVENOUS at 22:18

## 2018-11-20 RX ADMIN — PROMETHAZINE HYDROCHLORIDE 12.5 MG: 25 INJECTION INTRAMUSCULAR; INTRAVENOUS at 09:28

## 2018-11-20 RX ADMIN — FLUOXETINE HYDROCHLORIDE 60 MG: 20 CAPSULE ORAL at 08:41

## 2018-11-20 RX ADMIN — ALPRAZOLAM 2 MG: 0.5 TABLET ORAL at 20:50

## 2018-11-20 RX ADMIN — OXYCODONE HYDROCHLORIDE AND ACETAMINOPHEN 1 TABLET: 10; 325 TABLET ORAL at 01:49

## 2018-11-20 RX ADMIN — ACETAMINOPHEN 650 MG: 325 TABLET, FILM COATED ORAL at 07:19

## 2018-11-20 RX ADMIN — AMLODIPINE BESYLATE 10 MG: 5 TABLET ORAL at 08:41

## 2018-11-20 RX ADMIN — IBUPROFEN 600 MG: 600 TABLET ORAL at 09:27

## 2018-11-20 RX ADMIN — POTASSIUM BICARBONATE 20 MEQ: 782 TABLET, EFFERVESCENT ORAL at 20:46

## 2018-11-20 ASSESSMENT — PAIN DESCRIPTION - PAIN TYPE
TYPE: ACUTE PAIN
TYPE: ACUTE PAIN

## 2018-11-20 ASSESSMENT — PAIN DESCRIPTION - PROGRESSION

## 2018-11-20 ASSESSMENT — PAIN SCALES - GENERAL
PAINLEVEL_OUTOF10: 8
PAINLEVEL_OUTOF10: 8
PAINLEVEL_OUTOF10: 6
PAINLEVEL_OUTOF10: 8
PAINLEVEL_OUTOF10: 7
PAINLEVEL_OUTOF10: 4
PAINLEVEL_OUTOF10: 8
PAINLEVEL_OUTOF10: 0
PAINLEVEL_OUTOF10: 8
PAINLEVEL_OUTOF10: 4
PAINLEVEL_OUTOF10: 8

## 2018-11-20 ASSESSMENT — PAIN DESCRIPTION - LOCATION
LOCATION: ABDOMEN
LOCATION: ABDOMEN

## 2018-11-20 ASSESSMENT — PAIN DESCRIPTION - ORIENTATION
ORIENTATION: MID
ORIENTATION: MID

## 2018-11-20 ASSESSMENT — PAIN DESCRIPTION - DESCRIPTORS: DESCRIPTORS: ACHING

## 2018-11-20 NOTE — PROGRESS NOTES
Physical Therapy    Facility/Department: [address] ONCOLOGY  Initial Assessment    NAME: Mark Rodriguez  : 1955  MRN: [id number]    Date of Service: 2018    Discharge Recommendations: Mark Rodriguez scored a 21/24 on the AM-PAC short mobility form. Current research shows that an AM-PAC score of 18 or greater is typically associated with a discharge to the patient's home setting. Based on the patients AM-PAC score and their current functional mobility deficits, it is recommended that the patient have 2-3 sessions per week of Physical Therapy at d/c to increase the patients independence. HOME HEALTH CARE: LEVEL 1 STANDARD  - Initial home health evaluation to occur within 24-48 hours, in patient home   - Therapy to evaluate with goal of regaining prior level of functioning   - Therapy to evaluate if patient has [address] needs for personal care      PT Equipment Recommendations  Equipment Needed: No    Patient Diagnosis(es): The primary encounter diagnosis was Pneumonia due to organism. Diagnoses of Bronchitis and Septicemia ([address] were also pertinent to this visit. has a past medical history of Allergic; Anxiety; Back pain; Cancer ([address] Depression; Glaucoma; Hyperlipidemia; and Hypertension. has a past surgical history that includes  section; Hysterectomy; Knee arthroscopy; Appendectomy; Colonoscopy; Upper gastrointestinal endoscopy (2017); ERCP (N/A, 09/15/2017); Tunneled venous port placement (Right, 10/10/2017); and Endoscopy, colon, diagnostic. Restrictions  Restrictions/Precautions  Restrictions/Precautions: Fall Risk (medium fall risk)  Position Activity Restriction  Other position/activity restrictions: 66 y.o. female who presents here to the emergency department, the patient states that she has a history of CLL, and has chronic abdominal pain however she's been sick for the past 2 weeks with fevers and chills, and cough. She sees Dr. Fabian King.   She denies

## 2018-11-20 NOTE — PROGRESS NOTES
treatment well  Safety Devices  Safety Devices in place: Yes  Type of devices: Gait belt;Patient at risk for falls;Nurse notified; Left in chair; All fall risk precautions in place;Call light within reach; Chair alarm in place         Plan   Plan  Times per week: 2-5  Times per day: Daily  Current Treatment Recommendations: Strengthening, Balance Training, Functional Mobility Training, Endurance Training, Self-Care / ADL, Home Management Training, Patient/Caregiver Education & Training, Equipment Evaluation, Education, & procurement    G-Code  OT G-codes  Functional Limitation: Self care  Self Care Current Status (): At least 20 percent but less than 40 percent impaired, limited or restricted  Self Care Goal Status ():  At least 1 percent but less than 20 percent impaired, limited or restricted    AM-PAC Score        AM-[facility] Inpatient Daily Activity Raw Score: 20  AM-PAC Inpatient ADL T-Scale Score : 42.03  ADL Inpatient CMS 0-100% Score: 38.32  ADL Inpatient CMS G-Code Modifier : CJ    Goals  Short term goals  Time Frame for Short term goals: Discharge  Short term goal 1: Patient will bathe supervision  Short term goal 2: Patient will dress Mod I  Short term goal 3: Patient will perform functional ADl transfers mod I  Short term goal 4: Patient will perform functional mobility mod I  Long term goals  Time Frame for Long term goals : LTG=STG  Patient Goals   Patient goals : Home       Therapy Time   Individual Concurrent Group Co-treatment   Time In 0836         Time Out 0916         Minutes 40              Timed Code Treatment Minutes:   25    Total Treatment Minutes:  [address] [facility] OTR/L [id number]

## 2018-11-21 ENCOUNTER — APPOINTMENT (OUTPATIENT)
Dept: CT IMAGING | Age: 63
DRG: 853 | End: 2018-11-21
Payer: MEDICARE

## 2018-11-21 LAB
ANION GAP SERPL CALCULATED.3IONS-SCNC: 10 MMOL/L (ref 3–16)
BASOPHILS ABSOLUTE: 0 K/UL (ref 0–0.2)
BASOPHILS RELATIVE PERCENT: 0.5 %
BUN BLDV-MCNC: 13 MG/DL (ref 7–20)
CALCIUM SERPL-MCNC: 8.1 MG/DL (ref 8.3–10.6)
CHLORIDE BLD-SCNC: 104 MMOL/L (ref 99–110)
CO2: 25 MMOL/L (ref 21–32)
CREAT SERPL-MCNC: 0.8 MG/DL (ref 0.6–1.2)
EOSINOPHILS ABSOLUTE: 0 K/UL (ref 0–0.6)
EOSINOPHILS RELATIVE PERCENT: 0.6 %
GFR AFRICAN AMERICAN: >60
GFR NON-AFRICAN AMERICAN: >60
GLUCOSE BLD-MCNC: 92 MG/DL (ref 70–99)
HCT VFR BLD CALC: 30 % (ref 36–48)
HEMOGLOBIN: 9.9 G/DL (ref 12–16)
IGA: 107 MG/DL (ref 70–400)
IGG: 631 MG/DL (ref 700–1600)
IGM: 16 MG/DL (ref 40–230)
LYMPHOCYTES ABSOLUTE: 0.9 K/UL (ref 1–5.1)
LYMPHOCYTES RELATIVE PERCENT: 11.5 %
MCH RBC QN AUTO: 32.2 PG (ref 26–34)
MCHC RBC AUTO-ENTMCNC: 33.1 G/DL (ref 31–36)
MCV RBC AUTO: 97.3 FL (ref 80–100)
MONOCYTES ABSOLUTE: 0.5 K/UL (ref 0–1.3)
MONOCYTES RELATIVE PERCENT: 6.6 %
NEUTROPHILS ABSOLUTE: 6.3 K/UL (ref 1.7–7.7)
NEUTROPHILS RELATIVE PERCENT: 80.8 %
PDW BLD-RTO: 13.8 % (ref 12.4–15.4)
PLATELET # BLD: 371 K/UL (ref 135–450)
PMV BLD AUTO: 8.2 FL (ref 5–10.5)
POTASSIUM SERPL-SCNC: 4.2 MMOL/L (ref 3.5–5.1)
RBC # BLD: 3.08 M/UL (ref 4–5.2)
REPORT: NORMAL
RESPIRATORY PANEL PCR: NORMAL
SODIUM BLD-SCNC: 139 MMOL/L (ref 136–145)
WBC # BLD: 7.8 K/UL (ref 4–11)

## 2018-11-21 PROCEDURE — 2700000000 HC OXYGEN THERAPY PER DAY

## 2018-11-21 PROCEDURE — 86738 MYCOPLASMA ANTIBODY: CPT

## 2018-11-21 PROCEDURE — 94640 AIRWAY INHALATION TREATMENT: CPT

## 2018-11-21 PROCEDURE — 6360000002 HC RX W HCPCS: Performed by: INTERNAL MEDICINE

## 2018-11-21 PROCEDURE — 94760 N-INVAS EAR/PLS OXIMETRY 1: CPT

## 2018-11-21 PROCEDURE — 87581 M.PNEUMON DNA AMP PROBE: CPT

## 2018-11-21 PROCEDURE — 87486 CHLMYD PNEUM DNA AMP PROBE: CPT

## 2018-11-21 PROCEDURE — 87633 RESP VIRUS 12-25 TARGETS: CPT

## 2018-11-21 PROCEDURE — 87449 NOS EACH ORGANISM AG IA: CPT

## 2018-11-21 PROCEDURE — 6370000000 HC RX 637 (ALT 250 FOR IP): Performed by: INTERNAL MEDICINE

## 2018-11-21 PROCEDURE — 36415 COLL VENOUS BLD VENIPUNCTURE: CPT

## 2018-11-21 PROCEDURE — 6360000004 HC RX CONTRAST MEDICATION: Performed by: INTERNAL MEDICINE

## 2018-11-21 PROCEDURE — 71260 CT THORAX DX C+: CPT

## 2018-11-21 PROCEDURE — 2580000003 HC RX 258: Performed by: INTERNAL MEDICINE

## 2018-11-21 PROCEDURE — 99223 1ST HOSP IP/OBS HIGH 75: CPT | Performed by: INTERNAL MEDICINE

## 2018-11-21 PROCEDURE — 80048 BASIC METABOLIC PNL TOTAL CA: CPT

## 2018-11-21 PROCEDURE — 36591 DRAW BLOOD OFF VENOUS DEVICE: CPT

## 2018-11-21 PROCEDURE — 94667 MNPJ CHEST WALL 1ST: CPT

## 2018-11-21 PROCEDURE — 87798 DETECT AGENT NOS DNA AMP: CPT

## 2018-11-21 PROCEDURE — 1200000000 HC SEMI PRIVATE

## 2018-11-21 PROCEDURE — 85025 COMPLETE CBC W/AUTO DIFF WBC: CPT

## 2018-11-21 PROCEDURE — 2500000003 HC RX 250 WO HCPCS: Performed by: INTERNAL MEDICINE

## 2018-11-21 PROCEDURE — 87040 BLOOD CULTURE FOR BACTERIA: CPT

## 2018-11-21 PROCEDURE — 87086 URINE CULTURE/COLONY COUNT: CPT

## 2018-11-21 RX ORDER — IBUPROFEN 400 MG/1
400 TABLET ORAL EVERY 6 HOURS PRN
Status: DISCONTINUED | OUTPATIENT
Start: 2018-11-21 | End: 2018-11-28 | Stop reason: HOSPADM

## 2018-11-21 RX ADMIN — OXYCODONE HYDROCHLORIDE AND ACETAMINOPHEN 1 TABLET: 10; 325 TABLET ORAL at 18:18

## 2018-11-21 RX ADMIN — PROMETHAZINE HYDROCHLORIDE 12.5 MG: 25 INJECTION INTRAMUSCULAR; INTRAVENOUS at 04:57

## 2018-11-21 RX ADMIN — IPRATROPIUM BROMIDE AND ALBUTEROL SULFATE 1 AMPULE: .5; 3 SOLUTION RESPIRATORY (INHALATION) at 12:15

## 2018-11-21 RX ADMIN — GABAPENTIN 600 MG: 300 CAPSULE ORAL at 08:58

## 2018-11-21 RX ADMIN — PANTOPRAZOLE SODIUM 40 MG: 40 TABLET, DELAYED RELEASE ORAL at 05:04

## 2018-11-21 RX ADMIN — Medication 10 ML: at 09:02

## 2018-11-21 RX ADMIN — POTASSIUM BICARBONATE 20 MEQ: 782 TABLET, EFFERVESCENT ORAL at 20:58

## 2018-11-21 RX ADMIN — OXYCODONE HYDROCHLORIDE AND ACETAMINOPHEN 1 TABLET: 10; 325 TABLET ORAL at 20:58

## 2018-11-21 RX ADMIN — GUAIFENESIN AND CODEINE PHOSPHATE 5 ML: 10; 100 LIQUID ORAL at 00:05

## 2018-11-21 RX ADMIN — LEVOFLOXACIN 500 MG: 5 INJECTION, SOLUTION INTRAVENOUS at 08:57

## 2018-11-21 RX ADMIN — PRAVASTATIN SODIUM 20 MG: 20 TABLET ORAL at 08:58

## 2018-11-21 RX ADMIN — DOCUSATE SODIUM 100 MG: 100 CAPSULE, LIQUID FILLED ORAL at 08:58

## 2018-11-21 RX ADMIN — OXYCODONE HYDROCHLORIDE AND ACETAMINOPHEN 1 TABLET: 10; 325 TABLET ORAL at 00:05

## 2018-11-21 RX ADMIN — GUAIFENESIN AND CODEINE PHOSPHATE 5 ML: 10; 100 LIQUID ORAL at 04:54

## 2018-11-21 RX ADMIN — ONDANSETRON 4 MG: 2 INJECTION INTRAMUSCULAR; INTRAVENOUS at 10:35

## 2018-11-21 RX ADMIN — GABAPENTIN 600 MG: 300 CAPSULE ORAL at 20:58

## 2018-11-21 RX ADMIN — FLUOXETINE HYDROCHLORIDE 60 MG: 20 CAPSULE ORAL at 08:57

## 2018-11-21 RX ADMIN — ENOXAPARIN SODIUM 40 MG: 40 INJECTION SUBCUTANEOUS at 20:58

## 2018-11-21 RX ADMIN — VANCOMYCIN HYDROCHLORIDE 1250 MG: 10 INJECTION, POWDER, LYOPHILIZED, FOR SOLUTION INTRAVENOUS at 15:37

## 2018-11-21 RX ADMIN — PROMETHAZINE HYDROCHLORIDE 12.5 MG: 25 INJECTION INTRAMUSCULAR; INTRAVENOUS at 15:18

## 2018-11-21 RX ADMIN — ALPRAZOLAM 2 MG: 0.5 TABLET ORAL at 05:58

## 2018-11-21 RX ADMIN — TAZOBACTAM SODIUM AND PIPERACILLIN SODIUM 3.38 G: 375; 3 INJECTION, SOLUTION INTRAVENOUS at 18:18

## 2018-11-21 RX ADMIN — GUAIFENESIN AND CODEINE PHOSPHATE 5 ML: 10; 100 LIQUID ORAL at 13:36

## 2018-11-21 RX ADMIN — IBUPROFEN 400 MG: 400 TABLET, FILM COATED ORAL at 18:53

## 2018-11-21 RX ADMIN — OXYCODONE HYDROCHLORIDE AND ACETAMINOPHEN 1 TABLET: 10; 325 TABLET ORAL at 13:36

## 2018-11-21 RX ADMIN — IPRATROPIUM BROMIDE AND ALBUTEROL SULFATE 1 AMPULE: .5; 3 SOLUTION RESPIRATORY (INHALATION) at 05:38

## 2018-11-21 RX ADMIN — OXYCODONE HYDROCHLORIDE AND ACETAMINOPHEN 1 TABLET: 10; 325 TABLET ORAL at 08:58

## 2018-11-21 RX ADMIN — ALPRAZOLAM 2 MG: 0.5 TABLET ORAL at 17:11

## 2018-11-21 RX ADMIN — IOPAMIDOL 75 ML: 755 INJECTION, SOLUTION INTRAVENOUS at 09:46

## 2018-11-21 RX ADMIN — POTASSIUM BICARBONATE 20 MEQ: 782 TABLET, EFFERVESCENT ORAL at 08:58

## 2018-11-21 RX ADMIN — FUROSEMIDE 20 MG: 20 TABLET ORAL at 08:58

## 2018-11-21 RX ADMIN — ACETAMINOPHEN 650 MG: 325 TABLET, FILM COATED ORAL at 05:14

## 2018-11-21 RX ADMIN — GUAIFENESIN AND CODEINE PHOSPHATE 5 ML: 10; 100 LIQUID ORAL at 08:57

## 2018-11-21 RX ADMIN — GABAPENTIN 600 MG: 300 CAPSULE ORAL at 13:36

## 2018-11-21 RX ADMIN — OXYCODONE HYDROCHLORIDE AND ACETAMINOPHEN 1 TABLET: 10; 325 TABLET ORAL at 04:54

## 2018-11-21 RX ADMIN — IPRATROPIUM BROMIDE AND ALBUTEROL SULFATE 1 AMPULE: .5; 3 SOLUTION RESPIRATORY (INHALATION) at 19:50

## 2018-11-21 RX ADMIN — Medication 10 ML: at 20:59

## 2018-11-21 ASSESSMENT — PAIN SCALES - GENERAL
PAINLEVEL_OUTOF10: 0
PAINLEVEL_OUTOF10: 8
PAINLEVEL_OUTOF10: 0
PAINLEVEL_OUTOF10: 0
PAINLEVEL_OUTOF10: 8
PAINLEVEL_OUTOF10: 5
PAINLEVEL_OUTOF10: 8

## 2018-11-21 ASSESSMENT — PAIN DESCRIPTION - PROGRESSION
CLINICAL_PROGRESSION: GRADUALLY IMPROVING
CLINICAL_PROGRESSION: GRADUALLY IMPROVING

## 2018-11-21 NOTE — PROGRESS NOTES
initiated for medication and therapeutic interventions upon initiation of aerosolized medication. 2. Physician will be contacted for respiratory rate (RR) greater than 35 breaths per minute. Therapy will be held for heart rate (HR) greater than 140 beats per minute, pending direction from physician. 3. Bronchodilators will be administered via Metered Dose Inhaler (MDI) with spacer when the following criteria are met:  a. Alert and cooperative     b. HR < 140 bpm  c. RR < 30 bpm                d. Can demonstrate a 2-3 second inspiratory hold  4. Bronchodilators will be administered via Hand Held Nebulizer [facility]) to patients when ANY of the following criteria are met  a. Incognizant or uncooperative          b. Patients treated with HHN at Home        c. Unable to demonstrate proper MDI or HFA technique     d. RR > 30 bpm   5. Bronchodilators will be delivered via Metered Dose Inhaler (MDI) or Hydrofluoroalkane (HFA) with spacer to intubated patients on mechanical ventilation. 6. Inhalation medication orders will be delivered and/or substituted as outlined below. Aerosolized Medications Ordering and Administration Guidelines:    1. All Medications will be ordered by a physician, and their frequency and/or modality will be adjusted as defined by the patients Respiratory Severity Index (RSI) score. 2. If the patient does not have documented COPD, consider discontinuing anticholinergics when RSI is less than 9.  3. If the bronchospasm worsens (increased RSI), then the bronchodilator frequency can be increased to a maximum of every 4 hours. If greater than every 4 hours is required, the physician will be contacted. 4. If the bronchospasm improves, the frequency of the bronchodilator can be decreased, based on the patient's RSI, but not less than home treatment regimen frequency.   5. Bronchodilator(s) will be discontinued if patient has a RSI less than 9 and has received no scheduled or as needed treatment for 72 Hrs.    Patients Ordered on a Mucolytic Agent:    1. Must always be administered with a bronchodilator. 2. Discontinue if patient experiences worsened bronchospasm, or secretions have lessened to the point that the patient is able to clear them with a cough. Anti-inflammatory and Combination Medications:    1. If the patient lacks prior history of lung disease, is not using inhaled anti-inflammatory medication at home, and lacks wheezing by examination or by history for at least 24 hours, contact physician for possible discontinuation.

## 2018-11-21 NOTE — PROGRESS NOTES
Vw)    Result Date: 11/19/2018  EXAMINATION: TWO VIEWS OF THE CHEST 11/19/2018 10:22 am COMPARISON: CT chest 11/18/2018 and two-view chest 11/18/2018 HISTORY: ORDERING SYSTEM PROVIDED HISTORY: pneumonia TECHNOLOGIST PROVIDED HISTORY: Reason for exam:-> pneumonia Ordering Physician Provided Reason for Exam: pneumonia Acuity: Unknown Type of Exam: Unknown FINDINGS: Cardiac silhouette appears within normal limits for size. Mediastinum appears unremarkable. The hilar silhouettes appear unremarkable. Patchy alveolar infiltrates in the right middle lobe have decreased but persists, infiltrates in the left parahilar lung have increased. No pneumothorax is seen. Visualized osseous structures appear unremarkable. Right IJ Port-A-Cath tip overlies the proximal superior vena cava level. Infiltrates right middle lobe have decreased, infiltrates left parahilar lung have increased. When correlating with prior CT chest, the findings are suspicious for areas shifting atelectasis with pneumonia. Xr Chest Standard (2 Vw)    Result Date: 11/18/2018  EXAMINATION: TWO VIEWS OF THE CHEST 11/18/2018 10:30 am COMPARISON: 07/10/2018 HISTORY: ORDERING SYSTEM PROVIDED HISTORY: Chest Discomfort TECHNOLOGIST PROVIDED HISTORY: Reason for exam:->Chest Discomfort Ordering Physician Provided Reason for Exam: Cough (Patient reporting cough, congestion and abdominal pain for the past 2 weeks. Also reporting a fever. Reports she has a history of CLL. ) Acuity: Unknown Type of Exam: Unknown FINDINGS: Port is seen in the right chest wall. New heterogeneous right basilar pulmonary opacity has developed. No significant pleural effusion. No evidence of pneumothorax. Cardiac and mediastinal silhouettes are unchanged. Developing right basilar consolidation, concerning for pneumonia. Radiographic follow-up to resolution recommended.      Ct Chest W Contrast    Result Date: 11/18/2018  EXAMINATION: CT OF THE CHEST WITH CONTRAST; CT OF THE the right middle lobe. This is new over the past 4 months. In addition, there are smaller subsegmental patchy areas of airspace opacity and ground-glass opacity noted in all segments of both lungs compatible with multifocal pneumonia. No pleural effusion or pneumothorax. Central airways are patent. Soft Tissues/Bones: No acute finding. Multilevel degenerative disc changes seen within the thoracic spine. Abdomen/Pelvis: Organs: Liver, spleen, pancreas, adrenal glands are unremarkable. 2 or 3 small cortical cyst noted within the right kidney which are unchanged. Kidneys otherwise unremarkable. No hydronephrosis or perinephric stranding. No radiopaque stone seen in either kidney or ureter. GI/Bowel: Stomach and small bowel are unremarkable. Appendix is surgically absent. Colon is unremarkable. Pelvis: Surgical absence of uterus. No suspicious pelvic or adnexal mass or fluid collection. Urinary bladder is unremarkable. Peritoneum/Retroperitoneum: There are a few small subcentimeter lymph nodes within the retroperitoneal space-unchanged from prior exam. Bones/Soft Tissues: No acute findings. Multilevel degenerative disc changes and facet arthropathy is noted. Moderate right middle lobe pneumonia with some volume loss and consolidation. Mild multifocal airspace opacity noted throughout all remaining segments of both lungs compatible with multifocal pneumonia. Mildly prominent mediastinal and hilar lymphadenopathy either unchanged or slightly more prominent compared to July 2018. These likely represent reactive lymph nodes from suspected multifocal pneumonia or sequela from chronic lymphocytic leukemia. No acute intra-abdominal or pelvic finding. Stable mildly prominent retroperitoneal lymph nodes-likely sequela of CLL.      Ct Abdomen Pelvis W Iv Contrast Additional Contrast? None    Result Date: 11/18/2018  EXAMINATION: CT OF THE CHEST WITH CONTRAST; CT OF THE ABDOMEN AND PELVIS WITH CONTRAST 11/18/2018 12:29 pm; 11/18/2018 12:30 pm TECHNIQUE: CT of the chest was performed with the administration of intravenous contrast. Multiplanar reformatted images are provided for review. Dose modulation, iterative reconstruction, and/or weight based adjustment of the mA/kV was utilized to reduce the radiation dose to as low as reasonably achievable.; CT of the abdomen and pelvis was performed with the administration of intravenous contrast. Multiplanar reformatted images are provided for review. Dose modulation, iterative reconstruction, and/or weight based adjustment of the mA/kV was utilized to reduce the radiation dose to as low as reasonably achievable. COMPARISON: CT scan of the chest, abdomen, and pelvis July 10, 2018. HISTORY: ORDERING SYSTEM PROVIDED HISTORY: CLL evaluation per dr Marleny Schmitz TECHNOLOGIST PROVIDED HISTORY: Ordering Physician Provided Reason for Exam: cough Acuity: Unknown Type of Exam: Unknown; ORDERING SYSTEM PROVIDED HISTORY: CLL evaluation per Dr Marleny Schmitz TECHNOLOGIST PROVIDED HISTORY: If patient is on cardiac monitor and/or pulse ox, they may be taken off cardiac monitor and pulse ox, left on O2 if currently on. All monitors reattached when patient returns to room. Additional Contrast?->None Ordering Physician Provided Reason for Exam: cough Acuity: Unknown Type of Exam: Unknown FINDINGS: Chest: Mediastinum: Calcified subcarinal and left hilar lymph nodes are again seen. There are mildly prominent right paratracheal, AP window, subcarinal, and bilateral hilar noncalcified lymph nodes present which are either unchanged or slightly more prominent compared to July 2018. Central pulmonary arteries are unremarkable. No acute finding within the thoracic aorta. Coronary artery calcifications are present. No pericardial effusion. Lungs/pleura: Moderate to severe airspace opacity with some peripheral consolidation, volume loss, and air bronchograms within the right middle lobe.   This is new over the (gastroesophageal reflux disease) (8/15/2017)    Assessment: Established problem. Stable. Plan: Continue present orders/plan. HTN (hypertension) (8/15/2017)    Assessment: Established problem. Stable.  104./60    Plan: stay on same meds   HLD (hyperlipidemia) (8/15/2017)    Assessment: Established problem. Stable. Plan: Continue present orders/plan. CLL (chronic lymphocytic leukemia) ([facility] [address] (8/24/2017)    Assessment: Established problem. Stable. Counts reviewed    Plan: mgmt per Heme. [facility]   Fever - New Problem to me. Plan - continue ice packs as needed. Check urine for other source.               Toma Hurley  11/21/2018

## 2018-11-21 NOTE — CONSULTS
P Pulmonary and Critical Care   Consult Note      Reason for Consult: pneumonia  Requesting Physician: Dr. Radha Crowe:   [address] [facility] / [facility]:    Chief Complaint   Patient presents with    Cough     Patient reporting cough, congestion and abdominal pain for the past 2 weeks. Also reporting a fever. Reports she has a history of CLL.                      The patient is a 66 y.o. female with significant past medical history of:      Diagnosis Date    Allergic     Anxiety     Back pain     Cancer ([address]     lymphoma    Depression     Glaucoma     Hyperlipidemia     Hypertension    who presented 3 days ago with few days hx of cough, congestion and SOB  She has been a smoker with hx of CLL  Her evaluation on admission showed pneumonia  She did not improve despite the last few days of abx with recurrent fever reported  She is on 2 L nc  She continues to have cough with difficulty clearing secretions  She was not on any immunosuppressive treatment prior to admission      Past Surgical History:        Procedure Laterality Date    APPENDECTOMY       SECTION      COLONOSCOPY      ENDOSCOPY, COLON, DIAGNOSTIC      ERCP N/A 09/15/2017    HYSTERECTOMY      KNEE ARTHROSCOPY      right    TUNNELED VENOUS PORT PLACEMENT Right 10/10/2017    Power Port insertion with Antonella Reed MD in [facility] [address]  2017     Current Medications:    Current Facility-Administered Medications: piperacillin-tazobactam (ZOSYN) 3.375 g in dextrose 50 mL IVPB extended infusion (premix), 3.375 g, Intravenous, Q8H  vancomycin (VANCOCIN) 1000 mg in dextrose 5% 200 mL IVPB, 1,000 mg, Intravenous, Q12H  ibuprofen (ADVIL;MOTRIN) tablet 600 mg, 600 mg, Oral, Q6H PRN  oxyCODONE-acetaminophen (PERCOCET)  MG per tablet 1 tablet, 1 tablet, Oral, Q4H  ALPRAZolam (XANAX) tablet 2 mg, 2 mg, Oral, TID PRN  amLODIPine (NORVASC) tablet 10 mg, 10 mg, Oral, Daily  pravastatin (PRAVACHOL) tablet

## 2018-11-21 NOTE — PROGRESS NOTES
Pt assisted to bathroom and back to bed. PRN phenergan given at this time. Bed alarm engaged. Primary RN aware.

## 2018-11-21 NOTE — FLOWSHEET NOTE
11/21/18 0630   Vital Signs   Temp 99.9 °F (37.7 °C)   Pulse 99   Resp 16   BP (!) 106/99   Level of Consciousness 0   vitals have improved will continue to monitor

## 2018-11-21 NOTE — PROGRESS NOTES
obinutuzamab/chlorambucil 10/2017, continues on observation. WBC 7.8. Check QIG. Chronic pain - sees Dr. Holger Garcia     PNA - on IV antibiotics, O2, HHN per primary team.    Fever/confusion - Will check UA and culture.        Helio Lopez.  [phone]    Patient seen and examined. Agree with above.     Daisy Arevalo MD

## 2018-11-22 ENCOUNTER — APPOINTMENT (OUTPATIENT)
Dept: CT IMAGING | Age: 63
DRG: 853 | End: 2018-11-22
Payer: MEDICARE

## 2018-11-22 LAB
APTT: 30.2 SEC (ref 26–36)
INR BLD: 1.53 (ref 0.86–1.14)
L. PNEUMOPHILA SEROGP 1 UR AG: NORMAL
PROTHROMBIN TIME: 17.4 SEC (ref 9.8–13)
URINE CULTURE, ROUTINE: NORMAL

## 2018-11-22 PROCEDURE — 94640 AIRWAY INHALATION TREATMENT: CPT

## 2018-11-22 PROCEDURE — 2000000000 HC ICU R&B

## 2018-11-22 PROCEDURE — 6360000002 HC RX W HCPCS: Performed by: INTERNAL MEDICINE

## 2018-11-22 PROCEDURE — 81001 URINALYSIS AUTO W/SCOPE: CPT

## 2018-11-22 PROCEDURE — 6370000000 HC RX 637 (ALT 250 FOR IP): Performed by: INTERNAL MEDICINE

## 2018-11-22 PROCEDURE — 6360000004 HC RX CONTRAST MEDICATION: Performed by: INTERNAL MEDICINE

## 2018-11-22 PROCEDURE — 92526 ORAL FUNCTION THERAPY: CPT

## 2018-11-22 PROCEDURE — 2700000000 HC OXYGEN THERAPY PER DAY

## 2018-11-22 PROCEDURE — 86612 BLASTOMYCES ANTIBODY: CPT

## 2018-11-22 PROCEDURE — 85730 THROMBOPLASTIN TIME PARTIAL: CPT

## 2018-11-22 PROCEDURE — 99223 1ST HOSP IP/OBS HIGH 75: CPT | Performed by: INTERNAL MEDICINE

## 2018-11-22 PROCEDURE — 2500000003 HC RX 250 WO HCPCS: Performed by: INTERNAL MEDICINE

## 2018-11-22 PROCEDURE — 2580000003 HC RX 258: Performed by: INTERNAL MEDICINE

## 2018-11-22 PROCEDURE — 86606 ASPERGILLUS ANTIBODY: CPT

## 2018-11-22 PROCEDURE — 71275 CT ANGIOGRAPHY CHEST: CPT

## 2018-11-22 PROCEDURE — G8997 SWALLOW GOAL STATUS: HCPCS

## 2018-11-22 PROCEDURE — 92610 EVALUATE SWALLOWING FUNCTION: CPT

## 2018-11-22 PROCEDURE — 99233 SBSQ HOSP IP/OBS HIGH 50: CPT | Performed by: INTERNAL MEDICINE

## 2018-11-22 PROCEDURE — 51702 INSERT TEMP BLADDER CATH: CPT

## 2018-11-22 PROCEDURE — 94668 MNPJ CHEST WALL SBSQ: CPT

## 2018-11-22 PROCEDURE — 85610 PROTHROMBIN TIME: CPT

## 2018-11-22 PROCEDURE — G8996 SWALLOW CURRENT STATUS: HCPCS

## 2018-11-22 PROCEDURE — 94760 N-INVAS EAR/PLS OXIMETRY 1: CPT

## 2018-11-22 PROCEDURE — 87804 INFLUENZA ASSAY W/OPTIC: CPT

## 2018-11-22 PROCEDURE — 70486 CT MAXILLOFACIAL W/O DYE: CPT

## 2018-11-22 PROCEDURE — 87305 ASPERGILLUS AG IA: CPT

## 2018-11-22 PROCEDURE — 86698 HISTOPLASMA ANTIBODY: CPT

## 2018-11-22 PROCEDURE — 87385 HISTOPLASMA CAPSUL AG IA: CPT

## 2018-11-22 PROCEDURE — 86635 COCCIDIOIDES ANTIBODY: CPT

## 2018-11-22 RX ORDER — LINEZOLID 2 MG/ML
600 INJECTION, SOLUTION INTRAVENOUS EVERY 12 HOURS
Status: DISCONTINUED | OUTPATIENT
Start: 2018-11-22 | End: 2018-11-26

## 2018-11-22 RX ORDER — METHYLPREDNISOLONE SODIUM SUCCINATE 125 MG/2ML
60 INJECTION, POWDER, LYOPHILIZED, FOR SOLUTION INTRAMUSCULAR; INTRAVENOUS ONCE
Status: COMPLETED | OUTPATIENT
Start: 2018-11-22 | End: 2018-11-22

## 2018-11-22 RX ORDER — FLUOXETINE 10 MG/1
10 CAPSULE ORAL DAILY
Status: DISCONTINUED | OUTPATIENT
Start: 2018-11-23 | End: 2018-11-28 | Stop reason: HOSPADM

## 2018-11-22 RX ORDER — DIPHENHYDRAMINE HYDROCHLORIDE 50 MG/ML
INJECTION INTRAMUSCULAR; INTRAVENOUS
Status: DISCONTINUED
Start: 2018-11-22 | End: 2018-11-22 | Stop reason: WASHOUT

## 2018-11-22 RX ORDER — LEVOFLOXACIN 5 MG/ML
750 INJECTION, SOLUTION INTRAVENOUS EVERY 24 HOURS
Status: DISCONTINUED | OUTPATIENT
Start: 2018-11-23 | End: 2018-11-27

## 2018-11-22 RX ORDER — DIPHENHYDRAMINE HYDROCHLORIDE 50 MG/ML
INJECTION INTRAMUSCULAR; INTRAVENOUS
Status: DISPENSED
Start: 2018-11-22 | End: 2018-11-23

## 2018-11-22 RX ORDER — LACTOBACILLUS RHAMNOSUS GG 10B CELL
1 CAPSULE ORAL 2 TIMES DAILY WITH MEALS
Status: DISCONTINUED | OUTPATIENT
Start: 2018-11-22 | End: 2018-11-28 | Stop reason: HOSPADM

## 2018-11-22 RX ORDER — ACETAMINOPHEN 325 MG/1
650 TABLET ORAL ONCE
Status: COMPLETED | OUTPATIENT
Start: 2018-11-22 | End: 2018-11-22

## 2018-11-22 RX ORDER — METHYLPREDNISOLONE SODIUM SUCCINATE 125 MG/2ML
INJECTION, POWDER, LYOPHILIZED, FOR SOLUTION INTRAMUSCULAR; INTRAVENOUS
Status: DISPENSED
Start: 2018-11-22 | End: 2018-11-23

## 2018-11-22 RX ORDER — DIPHENHYDRAMINE HYDROCHLORIDE 50 MG/ML
25 INJECTION INTRAMUSCULAR; INTRAVENOUS ONCE
Status: COMPLETED | OUTPATIENT
Start: 2018-11-22 | End: 2018-11-22

## 2018-11-22 RX ADMIN — ALPRAZOLAM 2 MG: 0.5 TABLET ORAL at 18:06

## 2018-11-22 RX ADMIN — OXYCODONE HYDROCHLORIDE AND ACETAMINOPHEN 1 TABLET: 10; 325 TABLET ORAL at 01:09

## 2018-11-22 RX ADMIN — PANTOPRAZOLE SODIUM 40 MG: 40 TABLET, DELAYED RELEASE ORAL at 05:00

## 2018-11-22 RX ADMIN — PROMETHAZINE HYDROCHLORIDE 12.5 MG: 25 INJECTION INTRAMUSCULAR; INTRAVENOUS at 03:43

## 2018-11-22 RX ADMIN — GABAPENTIN 600 MG: 300 CAPSULE ORAL at 23:34

## 2018-11-22 RX ADMIN — Medication 1 CAPSULE: at 18:48

## 2018-11-22 RX ADMIN — DIPHENHYDRAMINE HYDROCHLORIDE 25 MG: 50 INJECTION, SOLUTION INTRAMUSCULAR; INTRAVENOUS at 18:09

## 2018-11-22 RX ADMIN — IPRATROPIUM BROMIDE AND ALBUTEROL SULFATE 1 AMPULE: .5; 3 SOLUTION RESPIRATORY (INHALATION) at 07:54

## 2018-11-22 RX ADMIN — Medication 10 ML: at 08:37

## 2018-11-22 RX ADMIN — POTASSIUM BICARBONATE 20 MEQ: 782 TABLET, EFFERVESCENT ORAL at 23:34

## 2018-11-22 RX ADMIN — POTASSIUM BICARBONATE 20 MEQ: 782 TABLET, EFFERVESCENT ORAL at 08:36

## 2018-11-22 RX ADMIN — PRAVASTATIN SODIUM 20 MG: 20 TABLET ORAL at 08:36

## 2018-11-22 RX ADMIN — GABAPENTIN 600 MG: 300 CAPSULE ORAL at 13:11

## 2018-11-22 RX ADMIN — LINEZOLID 600 MG: 600 INJECTION, SOLUTION INTRAVENOUS at 18:47

## 2018-11-22 RX ADMIN — OXYCODONE HYDROCHLORIDE AND ACETAMINOPHEN 1 TABLET: 10; 325 TABLET ORAL at 08:36

## 2018-11-22 RX ADMIN — ACETAMINOPHEN 650 MG: 325 TABLET, FILM COATED ORAL at 18:09

## 2018-11-22 RX ADMIN — METHYLPREDNISOLONE SODIUM SUCCINATE 60 MG: 125 INJECTION, POWDER, FOR SOLUTION INTRAMUSCULAR; INTRAVENOUS at 18:32

## 2018-11-22 RX ADMIN — OXYCODONE HYDROCHLORIDE AND ACETAMINOPHEN 1 TABLET: 10; 325 TABLET ORAL at 17:14

## 2018-11-22 RX ADMIN — VANCOMYCIN HYDROCHLORIDE 1250 MG: 10 INJECTION, POWDER, LYOPHILIZED, FOR SOLUTION INTRAVENOUS at 05:00

## 2018-11-22 RX ADMIN — Medication 10 ML: at 23:34

## 2018-11-22 RX ADMIN — FLUOXETINE HYDROCHLORIDE 60 MG: 20 CAPSULE ORAL at 08:36

## 2018-11-22 RX ADMIN — DOCUSATE SODIUM 100 MG: 100 CAPSULE, LIQUID FILLED ORAL at 08:36

## 2018-11-22 RX ADMIN — TAZOBACTAM SODIUM AND PIPERACILLIN SODIUM 3.38 G: 375; 3 INJECTION, SOLUTION INTRAVENOUS at 18:42

## 2018-11-22 RX ADMIN — LEVOFLOXACIN 500 MG: 5 INJECTION, SOLUTION INTRAVENOUS at 08:31

## 2018-11-22 RX ADMIN — ALPRAZOLAM 2 MG: 0.5 TABLET ORAL at 05:04

## 2018-11-22 RX ADMIN — GABAPENTIN 600 MG: 300 CAPSULE ORAL at 08:36

## 2018-11-22 RX ADMIN — ENOXAPARIN SODIUM 40 MG: 40 INJECTION SUBCUTANEOUS at 23:34

## 2018-11-22 RX ADMIN — IOPAMIDOL 75 ML: 755 INJECTION, SOLUTION INTRAVENOUS at 20:55

## 2018-11-22 RX ADMIN — SODIUM CHLORIDE: 9 INJECTION, SOLUTION INTRAVENOUS at 03:30

## 2018-11-22 RX ADMIN — IMMUNE GLOBULIN INTRAVENOUS (HUMAN) 30 G: 5 INJECTION, SOLUTION INTRAVENOUS at 16:37

## 2018-11-22 RX ADMIN — IBUPROFEN 400 MG: 400 TABLET, FILM COATED ORAL at 17:14

## 2018-11-22 RX ADMIN — GUAIFENESIN AND CODEINE PHOSPHATE 5 ML: 10; 100 LIQUID ORAL at 17:19

## 2018-11-22 RX ADMIN — TAZOBACTAM SODIUM AND PIPERACILLIN SODIUM 3.38 G: 375; 3 INJECTION, SOLUTION INTRAVENOUS at 01:10

## 2018-11-22 RX ADMIN — ISAVUCONAZONIUM SULFATE 372 MG: 186 CAPSULE ORAL at 18:36

## 2018-11-22 RX ADMIN — TAZOBACTAM SODIUM AND PIPERACILLIN SODIUM 3.38 G: 375; 3 INJECTION, SOLUTION INTRAVENOUS at 10:09

## 2018-11-22 RX ADMIN — PROMETHAZINE HYDROCHLORIDE 12.5 MG: 25 INJECTION INTRAMUSCULAR; INTRAVENOUS at 10:13

## 2018-11-22 RX ADMIN — OXYCODONE HYDROCHLORIDE AND ACETAMINOPHEN 1 TABLET: 10; 325 TABLET ORAL at 13:11

## 2018-11-22 RX ADMIN — IPRATROPIUM BROMIDE AND ALBUTEROL SULFATE 1 AMPULE: .5; 3 SOLUTION RESPIRATORY (INHALATION) at 20:04

## 2018-11-22 RX ADMIN — ACETAMINOPHEN 650 MG: 325 TABLET, FILM COATED ORAL at 22:10

## 2018-11-22 RX ADMIN — OXYCODONE HYDROCHLORIDE AND ACETAMINOPHEN 1 TABLET: 10; 325 TABLET ORAL at 05:01

## 2018-11-22 ASSESSMENT — ENCOUNTER SYMPTOMS
EYE DISCHARGE: 0
STRIDOR: 0
DIARRHEA: 0
CHEST TIGHTNESS: 0
ABDOMINAL PAIN: 0
CHOKING: 0
NAUSEA: 0
BLOOD IN STOOL: 0
COLOR CHANGE: 0
TROUBLE SWALLOWING: 0
COUGH: 1
PHOTOPHOBIA: 0
EYE REDNESS: 0
FACIAL SWELLING: 0
SHORTNESS OF BREATH: 1
RHINORRHEA: 0
APNEA: 0

## 2018-11-22 ASSESSMENT — PAIN SCALES - GENERAL
PAINLEVEL_OUTOF10: 8
PAINLEVEL_OUTOF10: 3
PAINLEVEL_OUTOF10: 8
PAINLEVEL_OUTOF10: 0
PAINLEVEL_OUTOF10: 8

## 2018-11-22 NOTE — CONSULTS
Infectious Diseases   Consult Note      Admission Date: 11/18/2018  Hospital Day: Hospital Day: 5  Attending: Hortencia Clemons MD  Date of service: 11/22/18    Subjective:     Presenting complaint:   Chief Complaint   Patient presents with    Cough     Patient reporting cough, congestion and abdominal pain for the past 2 weeks. Also reporting a fever. Reports she has a history of CLL. Reason for admission: PNA (pneumonia) [J18.9]  PNA (pneumonia) [J18.9]    Chief complaint/ Reason for consult:  Sepsis with high-grade fever , immunocompromised patient    HPI: Kady Bettencourt is a 66 y.o. female patient, who was seen at the request of Dr. Hortencia Clemons MD.    History was obtained from chart review and the patient. The patient was admitted on 11/18/2018. I have been consulted to see the patient for above mentioned reason(s). The patient has multiple medical comorbidities, and was brought to the ER for fever, chills and cough going on for 2 weeks. The patient is immunocompromised and has chronic lymphocytic leukemia. Chest x-ray on admission showed right lower lobe pneumonia. He was admitted. He was started on IV ceftriaxone and then was switched to IV Levaquin on the day of admission. IV Zosyn was added yesterday. Pulmonology service was consulted, who added IV vancomycin as well yesterday. Blood cultures from admission have been negative so far. CT chest with contrast was done yesterday which showed progressing multifocal pneumonia. The patient is still having high fever despite broad-spectrum antibiotics      I have been asked to see the patient for this complicated infection. Past Medical History: All past medical history reviewed today. Past Medical History:   Diagnosis Date    Allergic     Anxiety     Back pain     Cancer ([facility] [address]     lymphoma    Depression     Glaucoma     Hyperlipidemia     Hypertension          Past Surgical History:  All disturbance. Respiratory: Positive for cough and shortness of breath. Negative for apnea, choking, chest tightness and stridor. Cardiovascular: Negative for chest pain and palpitations. Gastrointestinal: Negative for abdominal pain, blood in stool, diarrhea and nausea. Endocrine: Negative for polydipsia, polyphagia and polyuria. Genitourinary: Negative for difficulty urinating, dysuria, frequency, hematuria, menstrual problem and vaginal discharge. Musculoskeletal: Negative for arthralgias, joint swelling, myalgias and neck stiffness. Skin: Negative for color change and rash. Allergic/Immunologic: Negative for immunocompromised state. Neurological: Negative for dizziness, seizures, speech difficulty, light-headedness and headaches. Hematological: Negative for adenopathy. Psychiatric/Behavioral: Negative for agitation, hallucinations and suicidal ideas. Objective:       PHYSICAL EXAM:      Vitals:   Vitals:    11/22/18 0351 11/22/18 0755 11/22/18 0830 11/22/18 1141   BP: 96/61  101/62 108/66   Pulse: 72  81 85   Resp: 18 18 18 18   Temp: 97.6 °F (36.4 °C)  97.7 °F (36.5 °C) 98.5 °F (36.9 °C)   TempSrc: Oral  Oral Oral   SpO2: 94% 93% 95% 94%   Weight:       Height:           Physical Exam   Constitutional: She is oriented to person, place, and time. She appears well-developed. No distress. Feels very tired   HENT:   Head: Normocephalic. Right Ear: External ear normal.   Left Ear: External ear normal.   Nose: Nose normal.   Mouth/Throat: Oropharynx is clear and moist.   Eyes: Pupils are equal, round, and reactive to light. Conjunctivae are normal. Right eye exhibits no discharge. Left eye exhibits no discharge. No scleral icterus. Neck: Normal range of motion. Neck supple. Cardiovascular: Normal rate and regular rhythm. Exam reveals no friction rub. No murmur heard. Pulmonary/Chest: Effort normal. No stridor. She has no wheezes. She has rales (coarse, b/l).  She exhibits no post chemotherapy with 6 cycles of Obintuzumab/Chlorambucil in October 2017  · Essential hypertension  · Mixed hyperlipidemia      Discussion:      The patient is immunocompromised due to CLL on chemotherapy although not leukopenic at this time. I reviewed the images of CT scan of the chest.  It is very impressive bilateral pneumonia. Differential is quite broad and includes while pneumonia, atypical pneumonia like mycoplasma chlamydia Legionella, pneumocystis, mold infections or typical bacterial organisms. High fever and no response to antibiotics is very concerning. Respiratory film array PCR panel in the nasopharyngeal swab was negative    Urine legionella ag negative    Plan:     Diagnostic Workup:    · Recommend a bronchoscopy with immunocompromised panel on bronchoalveolar lavage  · Will order serum fungal serologies, mycoplasma serology, aspergillus GM, Urine histoplasma antigen  · Continue to follow fever curve, WBC count and blood cultures  · Follow up on liverand renal functions closely  · Get Ct scan of sinuses to r/o fungal sinusitis. Antimicrobials: Will stop iv vancomycin and will start iv linezolid 600 mg q 12 hr. Will provide better coverage against atypical organisms like nocardia  · Will continue IV Zosyn 3.375 g every 8 hours  · Continue IV Levaquin.   Increase IV Levaquin dose to 750 mg every 24 hours  · Will start her on anti-mold coverage with Cresemba - 372 mg PO q 8 hours for first 6 doses (loading dose) followed by 372 mg daily  · Continue to monitor her vitals closely  · NPO after midnight for possible bronchoscopy  · Aspiration precaution  · DVT prophylaxis  · Discussed the above plan with patien  · Discussed all above with clinical pharmacist    Drug Monitoring:    · Continue serial monitoring for antibiotic toxicity as follows: Vanco trough, CMP, QTc interval  · Continue to watch for following: new or worsening fever, hypotension, hives, lip swelling and redness or

## 2018-11-22 NOTE — PROGRESS NOTES
Assessment complete. VSS. Patient resting in bed. Respirations even and easy. Call light in reach. Fall precautions in place. No needs expressed at this time. Will continue to monitor.

## 2018-11-22 NOTE — PROGRESS NOTES
P Pulmonary and Critical Care  F/U Note      Reason for Consult: pneumonia  Requesting Physician: Dr. Link Curling:   [address] [facility]:    Chief Complaint   Patient presents with    Cough     Patient reporting cough, congestion and abdominal pain for the past 2 weeks. Also reporting a fever. Reports she has a history of CLL.        Still with recurrent fever, on 2 L nc               Current Medications:    Current Facility-Administered Medications: vancomycin (VANCOCIN) 1,250 mg in dextrose 5 % 250 mL IVPB, 1,250 mg, Intravenous, Q12H  piperacillin-tazobactam (ZOSYN) 3.375 g in dextrose 50 mL IVPB extended infusion (premix), 3.375 g, Intravenous, Q8H  ibuprofen (ADVIL;MOTRIN) tablet 400 mg, 400 mg, Oral, Q6H PRN  oxyCODONE-acetaminophen (PERCOCET)  MG per tablet 1 tablet, 1 tablet, Oral, Q4H  ALPRAZolam (XANAX) tablet 2 mg, 2 mg, Oral, TID PRN  amLODIPine (NORVASC) tablet 10 mg, 10 mg, Oral, Daily  pravastatin (PRAVACHOL) tablet 20 mg, 20 mg, Oral, Daily  FLUoxetine (PROZAC) capsule 60 mg, 60 mg, Oral, Daily  docusate sodium (COLACE) capsule 100 mg, 100 mg, Oral, Daily  ondansetron (ZOFRAN-ODT) disintegrating tablet 4 mg, 4 mg, Oral, Q8H PRN  potassium bicarb-citric acid (EFFER-K) effervescent tablet 20 mEq, 20 mEq, Oral, BID  0.9 % sodium chloride infusion, , Intravenous, Continuous  sodium chloride flush 0.9 % injection 10 mL, 10 mL, Intravenous, 2 times per day  sodium chloride flush 0.9 % injection 10 mL, 10 mL, Intravenous, PRN  magnesium hydroxide (MILK OF MAGNESIA) 400 MG/5ML suspension 30 mL, 30 mL, Oral, Daily PRN  ondansetron (ZOFRAN) injection 4 mg, 4 mg, Intravenous, Q6H PRN  enoxaparin (LOVENOX) injection 40 mg, 40 mg, Subcutaneous, Nightly  levofloxacin (LEVAQUIN) 500 MG/100ML infusion 500 mg, 500 mg, Intravenous, Q24H  potassium chloride (KLOR-CON M) extended release tablet 40 mEq, 40 mEq, Oral, PRN **OR** potassium bicarb-citric acid (EFFER-K) effervescent tablet 40 mEq, 40 mEq, reviewed as a part of this visit. Assessment:     1. CAP  2. Acute hypoxic respiratory failure  3. COPD  4. Nicotine abuse    Plan:     - still with recurrent fever  - she has multi lobar ASD consistent with pneumonia/pneumonitis  - plan for bronchoscopy with BAL in am, explained risks associated with procedures  - continue Levaquin, Zosyn and Vancomycin.  Ask ID to see  - await swallow evaluation  - continue bronchodilators  - airway clearance

## 2018-11-22 NOTE — PROGRESS NOTES
team.    Fever/confusion - ID on case    IVIG 30 gm X1 while here  [de-identified] replan for Bronk tomorrow agreed to rule out atypical infections. Other care per primary and Pulmonary,ID    Once DC FU with Dr. Daiana Cabezas as OP in 2 weeks     Patient seen and examined. Agree with above.     Shady Darby MD   Hem-Oncology/ [address] office    O: [phone]  Toll free: [phone]    For provider only  M: [phone]

## 2018-11-23 ENCOUNTER — ANESTHESIA (OUTPATIENT)
Dept: ENDOSCOPY | Age: 63
DRG: 853 | End: 2018-11-23
Payer: MEDICARE

## 2018-11-23 ENCOUNTER — ANESTHESIA EVENT (OUTPATIENT)
Dept: ENDOSCOPY | Age: 63
DRG: 853 | End: 2018-11-23
Payer: MEDICARE

## 2018-11-23 PROBLEM — M54.50 CHRONIC LOW BACK PAIN: Status: RESOLVED | Noted: 2017-08-24 | Resolved: 2018-11-23

## 2018-11-23 PROBLEM — K62.5 RECTAL BLEEDING: Status: RESOLVED | Noted: 2017-08-15 | Resolved: 2018-11-23

## 2018-11-23 PROBLEM — R60.0 BILATERAL LOWER EXTREMITY EDEMA: Status: RESOLVED | Noted: 2017-09-12 | Resolved: 2018-11-23

## 2018-11-23 PROBLEM — G89.29 CHRONIC LOW BACK PAIN: Status: RESOLVED | Noted: 2017-08-24 | Resolved: 2018-11-23

## 2018-11-23 LAB
ANION GAP SERPL CALCULATED.3IONS-SCNC: 11 MMOL/L (ref 3–16)
APPEARANCE BAL (LAVAGE): ABNORMAL
BILIRUBIN URINE: NEGATIVE
BLOOD CULTURE, ROUTINE: NORMAL
BLOOD, URINE: ABNORMAL
BUN BLDV-MCNC: 9 MG/DL (ref 7–20)
CALCIUM SERPL-MCNC: 8.3 MG/DL (ref 8.3–10.6)
CHLORIDE BLD-SCNC: 103 MMOL/L (ref 99–110)
CLARITY: CLEAR
CLOT EVALUATION BAL: ABNORMAL
CO2: 29 MMOL/L (ref 21–32)
COLOR LAVAGE: COLORLESS
COLOR: YELLOW
CREAT SERPL-MCNC: 0.6 MG/DL (ref 0.6–1.2)
GFR AFRICAN AMERICAN: >60
GFR NON-AFRICAN AMERICAN: >60
GLUCOSE BLD-MCNC: 173 MG/DL (ref 70–99)
GLUCOSE URINE: NEGATIVE MG/DL
HCT VFR BLD CALC: 31.6 % (ref 36–48)
HEMOGLOBIN: 10.5 G/DL (ref 12–16)
KETONES, URINE: NEGATIVE MG/DL
LEUKOCYTE ESTERASE, URINE: NEGATIVE
LYMPHOCYTES, BAL: 13 % (ref 5–10)
MACROPHAGES, BAL: 32 % (ref 90–95)
MCH RBC QN AUTO: 31.6 PG (ref 26–34)
MCHC RBC AUTO-ENTMCNC: 33.3 G/DL (ref 31–36)
MCV RBC AUTO: 95.2 FL (ref 80–100)
MICROSCOPIC EXAMINATION: YES
MONOCYTES, BAL: 2 %
NITRITE, URINE: NEGATIVE
NUMBER OF CELLS COUNTED BAL (LAVAGE): 200
PDW BLD-RTO: 13.6 % (ref 12.4–15.4)
PH UA: 6
PLATELET # BLD: 365 K/UL (ref 135–450)
PMV BLD AUTO: 7.6 FL (ref 5–10.5)
POTASSIUM REFLEX MAGNESIUM: 3.6 MMOL/L (ref 3.5–5.1)
PROTEIN UA: NEGATIVE MG/DL
RAPID INFLUENZA  B AGN: NEGATIVE
RAPID INFLUENZA A AGN: NEGATIVE
RBC # BLD: 3.32 M/UL (ref 4–5.2)
RBC UA: ABNORMAL /HPF (ref 0–2)
RBC, BAL: 1200 /CUMM
RENAL EPITHELIAL, UA: ABNORMAL /HPF
SEGMENTED NEUTROPHILS, BAL: 53 % (ref 5–10)
SODIUM BLD-SCNC: 143 MMOL/L (ref 136–145)
SPECIFIC GRAVITY UA: <=1.005
URINE TYPE: ABNORMAL
UROBILINOGEN, URINE: 0.2 E.U./DL
VANCOMYCIN TROUGH: 4.2 UG/ML (ref 10–20)
VOLUME LAVAGE: 2 ML
WBC # BLD: 4.2 K/UL (ref 4–11)
WBC UA: ABNORMAL /HPF (ref 0–5)
WBC/EPI CELLS BAL: 430 /CUMM

## 2018-11-23 PROCEDURE — 88312 SPECIAL STAINS GROUP 1: CPT

## 2018-11-23 PROCEDURE — 6360000002 HC RX W HCPCS: Performed by: INTERNAL MEDICINE

## 2018-11-23 PROCEDURE — 85027 COMPLETE CBC AUTOMATED: CPT

## 2018-11-23 PROCEDURE — 87305 ASPERGILLUS AG IA: CPT

## 2018-11-23 PROCEDURE — 94667 MNPJ CHEST WALL 1ST: CPT

## 2018-11-23 PROCEDURE — 99291 CRITICAL CARE FIRST HOUR: CPT | Performed by: INTERNAL MEDICINE

## 2018-11-23 PROCEDURE — 31645 BRNCHSC W/THER ASPIR 1ST: CPT | Performed by: INTERNAL MEDICINE

## 2018-11-23 PROCEDURE — 87205 SMEAR GRAM STAIN: CPT

## 2018-11-23 PROCEDURE — 6370000000 HC RX 637 (ALT 250 FOR IP): Performed by: INTERNAL MEDICINE

## 2018-11-23 PROCEDURE — 87798 DETECT AGENT NOS DNA AMP: CPT

## 2018-11-23 PROCEDURE — 3700000001 HC ADD 15 MINUTES (ANESTHESIA): Performed by: INTERNAL MEDICINE

## 2018-11-23 PROCEDURE — 2500000003 HC RX 250 WO HCPCS: Performed by: INTERNAL MEDICINE

## 2018-11-23 PROCEDURE — 0B9D8ZZ DRAINAGE OF RIGHT MIDDLE LUNG LOBE, VIA NATURAL OR ARTIFICIAL OPENING ENDOSCOPIC: ICD-10-PCS | Performed by: INTERNAL MEDICINE

## 2018-11-23 PROCEDURE — 87556 M.TUBERCULO DNA AMP PROBE: CPT

## 2018-11-23 PROCEDURE — 87449 NOS EACH ORGANISM AG IA: CPT

## 2018-11-23 PROCEDURE — 80048 BASIC METABOLIC PNL TOTAL CA: CPT

## 2018-11-23 PROCEDURE — 97535 SELF CARE MNGMENT TRAINING: CPT

## 2018-11-23 PROCEDURE — 2709999900 HC NON-CHARGEABLE SUPPLY: Performed by: INTERNAL MEDICINE

## 2018-11-23 PROCEDURE — 87486 CHLMYD PNEUM DNA AMP PROBE: CPT

## 2018-11-23 PROCEDURE — 2580000003 HC RX 258: Performed by: INTERNAL MEDICINE

## 2018-11-23 PROCEDURE — 87102 FUNGUS ISOLATION CULTURE: CPT

## 2018-11-23 PROCEDURE — 87070 CULTURE OTHR SPECIMN AEROBIC: CPT

## 2018-11-23 PROCEDURE — 31624 DX BRONCHOSCOPE/LAVAGE: CPT | Performed by: INTERNAL MEDICINE

## 2018-11-23 PROCEDURE — 0BCB8ZZ EXTIRPATION OF MATTER FROM LEFT LOWER LOBE BRONCHUS, VIA NATURAL OR ARTIFICIAL OPENING ENDOSCOPIC: ICD-10-PCS | Performed by: INTERNAL MEDICINE

## 2018-11-23 PROCEDURE — 87299 ANTIBODY DETECTION NOS IF: CPT

## 2018-11-23 PROCEDURE — 3609010800 HC BRONCHOSCOPY ALVEOLAR LAVAGE: Performed by: INTERNAL MEDICINE

## 2018-11-23 PROCEDURE — 88305 TISSUE EXAM BY PATHOLOGIST: CPT

## 2018-11-23 PROCEDURE — 6360000002 HC RX W HCPCS: Performed by: NURSE ANESTHETIST, CERTIFIED REGISTERED

## 2018-11-23 PROCEDURE — 87633 RESP VIRUS 12-25 TARGETS: CPT

## 2018-11-23 PROCEDURE — 2000000000 HC ICU R&B

## 2018-11-23 PROCEDURE — 89051 BODY FLUID CELL COUNT: CPT

## 2018-11-23 PROCEDURE — 94762 N-INVAS EAR/PLS OXIMTRY CONT: CPT

## 2018-11-23 PROCEDURE — 94640 AIRWAY INHALATION TREATMENT: CPT

## 2018-11-23 PROCEDURE — 94664 DEMO&/EVAL PT USE INHALER: CPT

## 2018-11-23 PROCEDURE — 3700000000 HC ANESTHESIA ATTENDED CARE: Performed by: INTERNAL MEDICINE

## 2018-11-23 PROCEDURE — 87116 MYCOBACTERIA CULTURE: CPT

## 2018-11-23 PROCEDURE — 94668 MNPJ CHEST WALL SBSQ: CPT

## 2018-11-23 PROCEDURE — 88112 CYTOPATH CELL ENHANCE TECH: CPT

## 2018-11-23 PROCEDURE — 2700000000 HC OXYGEN THERAPY PER DAY

## 2018-11-23 PROCEDURE — 2580000003 HC RX 258: Performed by: NURSE ANESTHETIST, CERTIFIED REGISTERED

## 2018-11-23 PROCEDURE — 87206 SMEAR FLUORESCENT/ACID STAI: CPT

## 2018-11-23 PROCEDURE — 97530 THERAPEUTIC ACTIVITIES: CPT

## 2018-11-23 PROCEDURE — 87581 M.PNEUMON DNA AMP PROBE: CPT

## 2018-11-23 PROCEDURE — 87106 FUNGI IDENTIFICATION YEAST: CPT

## 2018-11-23 PROCEDURE — 2500000003 HC RX 250 WO HCPCS: Performed by: NURSE ANESTHETIST, CERTIFIED REGISTERED

## 2018-11-23 PROCEDURE — 94760 N-INVAS EAR/PLS OXIMETRY 1: CPT

## 2018-11-23 PROCEDURE — 80202 ASSAY OF VANCOMYCIN: CPT

## 2018-11-23 PROCEDURE — 87015 SPECIMEN INFECT AGNT CONCNTJ: CPT

## 2018-11-23 PROCEDURE — 1200000000 HC SEMI PRIVATE

## 2018-11-23 RX ORDER — SODIUM CHLORIDE 9 MG/ML
INJECTION, SOLUTION INTRAVENOUS CONTINUOUS PRN
Status: DISCONTINUED | OUTPATIENT
Start: 2018-11-23 | End: 2018-11-23 | Stop reason: SDUPTHER

## 2018-11-23 RX ORDER — PROPOFOL 10 MG/ML
INJECTION, EMULSION INTRAVENOUS PRN
Status: DISCONTINUED | OUTPATIENT
Start: 2018-11-23 | End: 2018-11-23 | Stop reason: SDUPTHER

## 2018-11-23 RX ORDER — ALPRAZOLAM 0.5 MG/1
1 TABLET ORAL ONCE
Status: COMPLETED | OUTPATIENT
Start: 2018-11-23 | End: 2018-11-23

## 2018-11-23 RX ORDER — GLYCOPYRROLATE 0.2 MG/ML
INJECTION INTRAMUSCULAR; INTRAVENOUS PRN
Status: DISCONTINUED | OUTPATIENT
Start: 2018-11-23 | End: 2018-11-23 | Stop reason: SDUPTHER

## 2018-11-23 RX ADMIN — ISAVUCONAZONIUM SULFATE 372 MG: 186 CAPSULE ORAL at 13:09

## 2018-11-23 RX ADMIN — PROPOFOL 10 MG: 10 INJECTION, EMULSION INTRAVENOUS at 11:21

## 2018-11-23 RX ADMIN — Medication 1 CAPSULE: at 17:19

## 2018-11-23 RX ADMIN — AMLODIPINE BESYLATE 10 MG: 5 TABLET ORAL at 13:08

## 2018-11-23 RX ADMIN — GABAPENTIN 600 MG: 300 CAPSULE ORAL at 13:08

## 2018-11-23 RX ADMIN — OXYCODONE HYDROCHLORIDE AND ACETAMINOPHEN 1 TABLET: 10; 325 TABLET ORAL at 13:08

## 2018-11-23 RX ADMIN — LINEZOLID 600 MG: 600 INJECTION, SOLUTION INTRAVENOUS at 06:39

## 2018-11-23 RX ADMIN — IPRATROPIUM BROMIDE AND ALBUTEROL SULFATE 1 AMPULE: .5; 3 SOLUTION RESPIRATORY (INHALATION) at 08:11

## 2018-11-23 RX ADMIN — PROPOFOL 20 MG: 10 INJECTION, EMULSION INTRAVENOUS at 11:26

## 2018-11-23 RX ADMIN — TAZOBACTAM SODIUM AND PIPERACILLIN SODIUM 3.38 G: 375; 3 INJECTION, SOLUTION INTRAVENOUS at 09:30

## 2018-11-23 RX ADMIN — TAZOBACTAM SODIUM AND PIPERACILLIN SODIUM 3.38 G: 375; 3 INJECTION, SOLUTION INTRAVENOUS at 17:19

## 2018-11-23 RX ADMIN — POTASSIUM BICARBONATE 20 MEQ: 782 TABLET, EFFERVESCENT ORAL at 20:27

## 2018-11-23 RX ADMIN — PROPOFOL 40 MG: 10 INJECTION, EMULSION INTRAVENOUS at 11:20

## 2018-11-23 RX ADMIN — FUROSEMIDE 20 MG: 20 TABLET ORAL at 13:08

## 2018-11-23 RX ADMIN — PROPOFOL 10 MG: 10 INJECTION, EMULSION INTRAVENOUS at 11:28

## 2018-11-23 RX ADMIN — PROPOFOL 20 MG: 10 INJECTION, EMULSION INTRAVENOUS at 11:23

## 2018-11-23 RX ADMIN — LINEZOLID 600 MG: 600 INJECTION, SOLUTION INTRAVENOUS at 17:19

## 2018-11-23 RX ADMIN — FLUOXETINE HYDROCHLORIDE 10 MG: 10 CAPSULE ORAL at 13:08

## 2018-11-23 RX ADMIN — IPRATROPIUM BROMIDE AND ALBUTEROL SULFATE 1 AMPULE: .5; 3 SOLUTION RESPIRATORY (INHALATION) at 20:01

## 2018-11-23 RX ADMIN — POTASSIUM BICARBONATE 20 MEQ: 782 TABLET, EFFERVESCENT ORAL at 13:07

## 2018-11-23 RX ADMIN — PRAVASTATIN SODIUM 20 MG: 20 TABLET ORAL at 13:08

## 2018-11-23 RX ADMIN — SODIUM CHLORIDE: 9 INJECTION, SOLUTION INTRAVENOUS at 11:19

## 2018-11-23 RX ADMIN — ENOXAPARIN SODIUM 40 MG: 40 INJECTION SUBCUTANEOUS at 20:26

## 2018-11-23 RX ADMIN — PROPOFOL 20 MG: 10 INJECTION, EMULSION INTRAVENOUS at 11:24

## 2018-11-23 RX ADMIN — ISAVUCONAZONIUM SULFATE 372 MG: 186 CAPSULE ORAL at 20:26

## 2018-11-23 RX ADMIN — Medication 10 ML: at 20:26

## 2018-11-23 RX ADMIN — LEVOFLOXACIN 750 MG: 5 INJECTION, SOLUTION INTRAVENOUS at 10:36

## 2018-11-23 RX ADMIN — OXYCODONE HYDROCHLORIDE AND ACETAMINOPHEN 1 TABLET: 10; 325 TABLET ORAL at 17:24

## 2018-11-23 RX ADMIN — DOCUSATE SODIUM 100 MG: 100 CAPSULE, LIQUID FILLED ORAL at 13:14

## 2018-11-23 RX ADMIN — ONDANSETRON 4 MG: 2 INJECTION INTRAMUSCULAR; INTRAVENOUS at 13:59

## 2018-11-23 RX ADMIN — Medication 10 ML: at 10:40

## 2018-11-23 RX ADMIN — PROPOFOL 10 MG: 10 INJECTION, EMULSION INTRAVENOUS at 11:25

## 2018-11-23 RX ADMIN — GLYCOPYRROLATE 0.2 MG: 0.2 INJECTION, SOLUTION INTRAMUSCULAR; INTRAVENOUS at 11:20

## 2018-11-23 RX ADMIN — GABAPENTIN 600 MG: 300 CAPSULE ORAL at 20:27

## 2018-11-23 RX ADMIN — ALPRAZOLAM 1 MG: 0.5 TABLET ORAL at 20:27

## 2018-11-23 RX ADMIN — Medication 1 CAPSULE: at 13:14

## 2018-11-23 RX ADMIN — PROPOFOL 20 MG: 10 INJECTION, EMULSION INTRAVENOUS at 11:22

## 2018-11-23 RX ADMIN — TAZOBACTAM SODIUM AND PIPERACILLIN SODIUM 3.38 G: 375; 3 INJECTION, SOLUTION INTRAVENOUS at 03:34

## 2018-11-23 ASSESSMENT — PAIN DESCRIPTION - LOCATION
LOCATION: ABDOMEN
LOCATION: ABDOMEN

## 2018-11-23 ASSESSMENT — PAIN DESCRIPTION - PAIN TYPE
TYPE: ACUTE PAIN
TYPE: CHRONIC PAIN

## 2018-11-23 ASSESSMENT — PAIN SCALES - GENERAL
PAINLEVEL_OUTOF10: 8
PAINLEVEL_OUTOF10: 0
PAINLEVEL_OUTOF10: 8
PAINLEVEL_OUTOF10: 0
PAINLEVEL_OUTOF10: 6

## 2018-11-23 ASSESSMENT — PAIN DESCRIPTION - PROGRESSION: CLINICAL_PROGRESSION: GRADUALLY WORSENING

## 2018-11-23 ASSESSMENT — PAIN DESCRIPTION - DESCRIPTORS: DESCRIPTORS: ACHING

## 2018-11-23 ASSESSMENT — PAIN DESCRIPTION - ORIENTATION: ORIENTATION: MID

## 2018-11-23 NOTE — PROGRESS NOTES
Reassessment complete. Pt is afebrile at this time, all other VSS as well. Pt is sleeping comfortably and maintaning O2 sats on vapotherm. Will continue to monitor.

## 2018-11-23 NOTE — ANESTHESIA PRE PROCEDURE
Department of Anesthesiology  Preprocedure Note       Name:  Aviva Bartlett   Age:  66 y.o.  :  1955                                          MRN:  [id number]         Date:  2018      Surgeon: Vishnu Barber):  Luis Live MD    Procedure: BRONCHOSCOPY ALVEOLAR LAVAGE (N/A )    Medications prior to admission:   Prior to Admission medications    Medication Sig Start Date End Date Taking? Authorizing Provider   oxyCODONE-acetaminophen (PERCOCET)  MG per tablet Take 1 tablet by mouth 4 times daily. .   Yes Historical Provider, MD   OxyCODONE ER (XTAMPZA ER) 9 MG C12A Take by mouth 2 times daily. Marco Roa Historical Provider, MD   levofloxacin (LEVAQUIN) 750 MG tablet Take 1 tablet by mouth daily for 7 days 18 Yes TANNER Millan   albuterol sulfate  (90 Base) MCG/ACT inhaler Use 2 puffs 4 times daily for 7 days then as needed for wheezing. Dispense with Spacer and instruct in use. At patient's preference may use 60 dose MDI. May Sub Pro-Air or Proventil as needed per insurance. 18  Yes TANNER Millan   benzonatate (TESSALON PERLES) 100 MG capsule Take 1 capsule by mouth 3 times daily as needed for Cough 18  Yes TANNER Millan   gabapentin (NEURONTIN) 600 MG tablet Take 600 mg by mouth 3 times daily. .   Yes Historical Provider, MD   potassium chloride (KLOR-CON) 20 MEQ packet Take 20 mEq by mouth 2 times daily   Yes Historical Provider, MD   furosemide (LASIX) 20 MG tablet Take 20 mg by mouth daily    Yes Historical Provider, MD   ondansetron (ZOFRAN) 4 MG tablet Take 4 mg by mouth every 8 hours as needed for Nausea or Vomiting   Yes Historical Provider, MD   docusate sodium (COLACE) 100 MG capsule Take 1 capsule by mouth daily 17  Yes Kevin Dunne MD   omeprazole (PRILOSEC) 40 MG capsule Take 40 mg by mouth daily. Yes Historical Provider, MD   ALPRAZolam Lorri Charisse) 2 MG tablet Take 2 mg by mouth 3 times daily as needed for Sleep.    Yes Historical Provider, MD   amLODIPine (NORVASC) 10 MG tablet Take 10 mg by mouth daily. Yes Historical Provider, MD   pravastatin (PRAVACHOL) 20 MG tablet Take 20 mg by mouth daily. Yes Historical Provider, MD   FLUoxetine (PROZAC) 20 MG capsule Take 60 mg by mouth daily.    Yes Historical Provider, MD   SUMAtriptan (IMITREX) 25 MG tablet Take 100 mg by mouth once as needed for Migraine    Yes Historical Provider, MD       Current medications:    Current Facility-Administered Medications   Medication Dose Route Frequency Provider Last Rate Last Dose    levofloxacin (LEVAQUIN) 750 MG/150ML infusion 750 mg  750 mg Intravenous Q24H Nii Chris  mL/hr at 11/23/18 1036 750 mg at 11/23/18 1036    lactobacillus (CULTURELLE) capsule 1 capsule  1 capsule Oral BID WC Nii Chris MD   1 capsule at 11/22/18 1848    linezolid (ZYVOX) IVPB 600 mg  600 mg Intravenous Q12H Nii Chris MD   Stopped at 11/23/18 0739    Isavuconazonium Sulfate CAPS 372 mg  372 mg Oral Q8H Nii Chris MD   Stopped at 11/23/18 0331    Followed by   Rigoberto Quintero ON 11/25/2018] Isavuconazonium Sulfate CAPS 372 mg  372 mg Oral Daily Nii Chris MD        FLUoxetine (PROZAC) capsule 10 mg  10 mg Oral Daily Nii Chris MD        piperacillin-tazobactam (ZOSYN) 3.375 g in dextrose 50 mL IVPB extended infusion (premix)  3.375 g Intravenous Q8H Cheryl Serrato MD   Stopped at 11/23/18 0734    ibuprofen (ADVIL;MOTRIN) tablet 400 mg  400 mg Oral Q6H PRN Boaz Good MD   400 mg at 11/22/18 1714    oxyCODONE-acetaminophen (PERCOCET)  MG per tablet 1 tablet  1 tablet Oral Q4H Cheryl Serrato MD   Stopped at 11/22/18 2213    ALPRAZolam Sherlyn Mayotte) tablet 2 mg  2 mg Oral TID PRN Cheryl Serrato MD   2 mg at 11/22/18 1806    amLODIPine (NORVASC) tablet 10 mg  10 mg Oral Daily Cheryl Serrato MD   Stopped at 11/21/18 0858    pravastatin (PRAVACHOL) tablet 20 mg  20 mg Oral Daily Cheryl Serrato MD   20 mg at 11/22/18 0836    docusate

## 2018-11-23 NOTE — PROGRESS NOTES
Shift assessment completed see flow sheet. Morning po medications held at this time dur to NPO status for bronch. Lung sounds are clear in the upper lobes and diminished in the lower lobes. Pt taken off Vapotherm and placed on 5L high flow and tolerating very well. Bowel sounds are active. Pedal pulses are palpable.  at bedside and updated, decision made to bronch pt today. Family at bedside and updated.  Will continue to [place]

## 2018-11-23 NOTE — PROGRESS NOTES
Oncology and Hematology Care   Progress Note      11/22/2018 2:47 PM        Name: Arnulfo Coleman . Admitted: 11/18/2018    SUBJECTIVE:    Even noted  Episode of Fever and tachy after started IVIG last nadeem.   IVIG was stopped  Patient had fever started > 99 before IVIG started  Patient was transferred to ICU after rapid responce    S/p bronch now  Mild sedated    Reviewed interval ancillary notes    Current Medications    [START ON 11/23/2018] levofloxacin (LEVAQUIN) 750 MG/150ML infusion 750 mg Q24H   immune globulin (human) 10% solution 30 g Once   vancomycin (VANCOCIN) 1,250 mg in dextrose 5 % 250 mL IVPB Q12H   piperacillin-tazobactam (ZOSYN) 3.375 g in dextrose 50 mL IVPB extended infusion (premix) Q8H   ibuprofen (ADVIL;MOTRIN) tablet 400 mg Q6H PRN   oxyCODONE-acetaminophen (PERCOCET)  MG per tablet 1 tablet Q4H   ALPRAZolam (XANAX) tablet 2 mg TID PRN   amLODIPine (NORVASC) tablet 10 mg Daily   pravastatin (PRAVACHOL) tablet 20 mg Daily   FLUoxetine (PROZAC) capsule 60 mg Daily   docusate sodium (COLACE) capsule 100 mg Daily   ondansetron (ZOFRAN-ODT) disintegrating tablet 4 mg Q8H PRN   potassium bicarb-citric acid (EFFER-K) effervescent tablet 20 mEq BID   0.9 % sodium chloride infusion Continuous   sodium chloride flush 0.9 % injection 10 mL 2 times per day   sodium chloride flush 0.9 % injection 10 mL PRN   magnesium hydroxide (MILK OF MAGNESIA) 400 MG/5ML suspension 30 mL Daily PRN   ondansetron (ZOFRAN) injection 4 mg Q6H PRN   enoxaparin (LOVENOX) injection 40 mg Nightly   potassium chloride (KLOR-CON M) extended release tablet 40 mEq PRN   Or    potassium bicarb-citric acid (EFFER-K) effervescent tablet 40 mEq PRN   Or    potassium chloride 10 mEq/100 mL IVPB (Peripheral Line) PRN   pantoprazole (PROTONIX) tablet 40 mg QAM AC   influenza quadrivalent split vaccine (FLUZONE;FLUARIX;FLULAVAL;AFLURIA) injection 0.5 mL Once   lidocaine-prilocaine (EMLA) cream PRN   ipratropium-albuterol

## 2018-11-24 LAB
ANION GAP SERPL CALCULATED.3IONS-SCNC: 7 MMOL/L (ref 3–16)
BASOPHILS ABSOLUTE: 0 K/UL (ref 0–0.2)
BASOPHILS RELATIVE PERCENT: 0.6 %
BUN BLDV-MCNC: 13 MG/DL (ref 7–20)
CALCIUM SERPL-MCNC: 8.2 MG/DL (ref 8.3–10.6)
CHLORIDE BLD-SCNC: 105 MMOL/L (ref 99–110)
CO2: 30 MMOL/L (ref 21–32)
CREAT SERPL-MCNC: 0.8 MG/DL (ref 0.6–1.2)
EOSINOPHILS ABSOLUTE: 0 K/UL (ref 0–0.6)
EOSINOPHILS RELATIVE PERCENT: 0.5 %
GFR AFRICAN AMERICAN: >60
GFR NON-AFRICAN AMERICAN: >60
GLUCOSE BLD-MCNC: 98 MG/DL (ref 70–99)
HCT VFR BLD CALC: 28.5 % (ref 36–48)
HEMOGLOBIN: 9.3 G/DL (ref 12–16)
LYMPHOCYTES ABSOLUTE: 1 K/UL (ref 1–5.1)
LYMPHOCYTES RELATIVE PERCENT: 20.9 %
MCH RBC QN AUTO: 31.3 PG (ref 26–34)
MCHC RBC AUTO-ENTMCNC: 32.5 G/DL (ref 31–36)
MCV RBC AUTO: 96.2 FL (ref 80–100)
MONOCYTES ABSOLUTE: 0.4 K/UL (ref 0–1.3)
MONOCYTES RELATIVE PERCENT: 9.2 %
NEUTROPHILS ABSOLUTE: 3.3 K/UL (ref 1.7–7.7)
NEUTROPHILS RELATIVE PERCENT: 68.8 %
PDW BLD-RTO: 14.1 % (ref 12.4–15.4)
PLATELET # BLD: 343 K/UL (ref 135–450)
PMV BLD AUTO: 8.1 FL (ref 5–10.5)
POTASSIUM SERPL-SCNC: 3.9 MMOL/L (ref 3.5–5.1)
RBC # BLD: 2.97 M/UL (ref 4–5.2)
SODIUM BLD-SCNC: 142 MMOL/L (ref 136–145)
WBC # BLD: 4.8 K/UL (ref 4–11)

## 2018-11-24 PROCEDURE — 6370000000 HC RX 637 (ALT 250 FOR IP): Performed by: INTERNAL MEDICINE

## 2018-11-24 PROCEDURE — 6360000002 HC RX W HCPCS: Performed by: INTERNAL MEDICINE

## 2018-11-24 PROCEDURE — 94668 MNPJ CHEST WALL SBSQ: CPT

## 2018-11-24 PROCEDURE — 99233 SBSQ HOSP IP/OBS HIGH 50: CPT | Performed by: INTERNAL MEDICINE

## 2018-11-24 PROCEDURE — 80048 BASIC METABOLIC PNL TOTAL CA: CPT

## 2018-11-24 PROCEDURE — 94640 AIRWAY INHALATION TREATMENT: CPT

## 2018-11-24 PROCEDURE — 2580000003 HC RX 258: Performed by: INTERNAL MEDICINE

## 2018-11-24 PROCEDURE — 1200000000 HC SEMI PRIVATE

## 2018-11-24 PROCEDURE — 94760 N-INVAS EAR/PLS OXIMETRY 1: CPT

## 2018-11-24 PROCEDURE — 2500000003 HC RX 250 WO HCPCS: Performed by: INTERNAL MEDICINE

## 2018-11-24 PROCEDURE — 2700000000 HC OXYGEN THERAPY PER DAY

## 2018-11-24 PROCEDURE — 94762 N-INVAS EAR/PLS OXIMTRY CONT: CPT

## 2018-11-24 PROCEDURE — 36592 COLLECT BLOOD FROM PICC: CPT

## 2018-11-24 PROCEDURE — 85025 COMPLETE CBC W/AUTO DIFF WBC: CPT

## 2018-11-24 RX ORDER — 0.9 % SODIUM CHLORIDE 0.9 %
500 INTRAVENOUS SOLUTION INTRAVENOUS ONCE
Status: COMPLETED | OUTPATIENT
Start: 2018-11-24 | End: 2018-11-24

## 2018-11-24 RX ADMIN — LEVOFLOXACIN 750 MG: 5 INJECTION, SOLUTION INTRAVENOUS at 08:48

## 2018-11-24 RX ADMIN — IPRATROPIUM BROMIDE AND ALBUTEROL SULFATE 1 AMPULE: .5; 3 SOLUTION RESPIRATORY (INHALATION) at 19:18

## 2018-11-24 RX ADMIN — ONDANSETRON 4 MG: 2 INJECTION INTRAMUSCULAR; INTRAVENOUS at 04:41

## 2018-11-24 RX ADMIN — GUAIFENESIN AND CODEINE PHOSPHATE 5 ML: 10; 100 LIQUID ORAL at 02:17

## 2018-11-24 RX ADMIN — TAZOBACTAM SODIUM AND PIPERACILLIN SODIUM 3.38 G: 375; 3 INJECTION, SOLUTION INTRAVENOUS at 17:13

## 2018-11-24 RX ADMIN — Medication 1 CAPSULE: at 08:47

## 2018-11-24 RX ADMIN — Medication 10 ML: at 20:13

## 2018-11-24 RX ADMIN — OXYCODONE HYDROCHLORIDE AND ACETAMINOPHEN 1 TABLET: 10; 325 TABLET ORAL at 04:41

## 2018-11-24 RX ADMIN — FUROSEMIDE 20 MG: 20 TABLET ORAL at 08:47

## 2018-11-24 RX ADMIN — ONDANSETRON 4 MG: 2 INJECTION INTRAMUSCULAR; INTRAVENOUS at 17:13

## 2018-11-24 RX ADMIN — OXYCODONE HYDROCHLORIDE AND ACETAMINOPHEN 1 TABLET: 10; 325 TABLET ORAL at 17:13

## 2018-11-24 RX ADMIN — ISAVUCONAZONIUM SULFATE 372 MG: 186 CAPSULE ORAL at 04:53

## 2018-11-24 RX ADMIN — LINEZOLID 600 MG: 600 INJECTION, SOLUTION INTRAVENOUS at 05:56

## 2018-11-24 RX ADMIN — OXYCODONE HYDROCHLORIDE AND ACETAMINOPHEN 1 TABLET: 10; 325 TABLET ORAL at 13:21

## 2018-11-24 RX ADMIN — FLUOXETINE HYDROCHLORIDE 10 MG: 10 CAPSULE ORAL at 08:47

## 2018-11-24 RX ADMIN — LINEZOLID 600 MG: 600 INJECTION, SOLUTION INTRAVENOUS at 17:13

## 2018-11-24 RX ADMIN — SODIUM CHLORIDE 500 ML: 9 INJECTION, SOLUTION INTRAVENOUS at 01:11

## 2018-11-24 RX ADMIN — ENOXAPARIN SODIUM 40 MG: 40 INJECTION SUBCUTANEOUS at 20:13

## 2018-11-24 RX ADMIN — GABAPENTIN 600 MG: 300 CAPSULE ORAL at 13:21

## 2018-11-24 RX ADMIN — DOCUSATE SODIUM 100 MG: 100 CAPSULE, LIQUID FILLED ORAL at 08:47

## 2018-11-24 RX ADMIN — GABAPENTIN 600 MG: 300 CAPSULE ORAL at 08:47

## 2018-11-24 RX ADMIN — POTASSIUM BICARBONATE 20 MEQ: 782 TABLET, EFFERVESCENT ORAL at 20:12

## 2018-11-24 RX ADMIN — TAZOBACTAM SODIUM AND PIPERACILLIN SODIUM 3.38 G: 375; 3 INJECTION, SOLUTION INTRAVENOUS at 10:57

## 2018-11-24 RX ADMIN — POTASSIUM BICARBONATE 20 MEQ: 782 TABLET, EFFERVESCENT ORAL at 08:48

## 2018-11-24 RX ADMIN — PRAVASTATIN SODIUM 20 MG: 20 TABLET ORAL at 08:47

## 2018-11-24 RX ADMIN — SODIUM CHLORIDE: 9 INJECTION, SOLUTION INTRAVENOUS at 04:18

## 2018-11-24 RX ADMIN — SODIUM CHLORIDE: 9 INJECTION, SOLUTION INTRAVENOUS at 17:13

## 2018-11-24 RX ADMIN — ISAVUCONAZONIUM SULFATE 372 MG: 186 CAPSULE ORAL at 13:21

## 2018-11-24 RX ADMIN — PANTOPRAZOLE SODIUM 40 MG: 40 TABLET, DELAYED RELEASE ORAL at 05:56

## 2018-11-24 RX ADMIN — GABAPENTIN 600 MG: 300 CAPSULE ORAL at 20:12

## 2018-11-24 RX ADMIN — IPRATROPIUM BROMIDE AND ALBUTEROL SULFATE 1 AMPULE: .5; 3 SOLUTION RESPIRATORY (INHALATION) at 09:07

## 2018-11-24 RX ADMIN — Medication 10 ML: at 08:48

## 2018-11-24 RX ADMIN — ISAVUCONAZONIUM SULFATE 372 MG: 186 CAPSULE ORAL at 21:04

## 2018-11-24 RX ADMIN — OXYCODONE HYDROCHLORIDE AND ACETAMINOPHEN 1 TABLET: 10; 325 TABLET ORAL at 20:12

## 2018-11-24 RX ADMIN — ALPRAZOLAM 2 MG: 0.5 TABLET ORAL at 23:15

## 2018-11-24 RX ADMIN — OXYCODONE HYDROCHLORIDE AND ACETAMINOPHEN 1 TABLET: 10; 325 TABLET ORAL at 08:47

## 2018-11-24 RX ADMIN — TAZOBACTAM SODIUM AND PIPERACILLIN SODIUM 3.38 G: 375; 3 INJECTION, SOLUTION INTRAVENOUS at 01:06

## 2018-11-24 RX ADMIN — Medication 1 CAPSULE: at 17:13

## 2018-11-24 ASSESSMENT — PAIN DESCRIPTION - PAIN TYPE
TYPE: CHRONIC PAIN

## 2018-11-24 ASSESSMENT — PAIN DESCRIPTION - ONSET: ONSET: ON-GOING

## 2018-11-24 ASSESSMENT — PAIN SCALES - GENERAL
PAINLEVEL_OUTOF10: 6
PAINLEVEL_OUTOF10: 5
PAINLEVEL_OUTOF10: 4
PAINLEVEL_OUTOF10: 8
PAINLEVEL_OUTOF10: 3
PAINLEVEL_OUTOF10: 8
PAINLEVEL_OUTOF10: 5
PAINLEVEL_OUTOF10: 9
PAINLEVEL_OUTOF10: 0
PAINLEVEL_OUTOF10: 7
PAINLEVEL_OUTOF10: 8
PAINLEVEL_OUTOF10: 6
PAINLEVEL_OUTOF10: 9
PAINLEVEL_OUTOF10: 0
PAINLEVEL_OUTOF10: 7

## 2018-11-24 ASSESSMENT — PAIN DESCRIPTION - LOCATION
LOCATION: ABDOMEN

## 2018-11-24 ASSESSMENT — PAIN DESCRIPTION - PROGRESSION: CLINICAL_PROGRESSION: NOT CHANGED

## 2018-11-24 ASSESSMENT — PAIN DESCRIPTION - DESCRIPTORS: DESCRIPTORS: CONSTANT

## 2018-11-24 ASSESSMENT — PAIN DESCRIPTION - FREQUENCY: FREQUENCY: CONTINUOUS

## 2018-11-24 NOTE — PROGRESS NOTES
Received call from pt @ 0000 requesting pain medication. Informed pt that medication is due @ 0100. Entered room a few minutes later for assessment and pt was asleep in bed. VSS. Reassessment complete and unchanged. Will monitor.  Sneha Martin

## 2018-11-24 NOTE — PROGRESS NOTES
P Pulmonary and Critical Care  F/U Note      Reason for Consult: pneumonia  Requesting Physician: Dr. Manolo Harrison:   [facility]:    Chief Complaint   Patient presents with    Cough     Patient reporting cough, congestion and abdominal pain for the past 2 weeks. Also reporting a fever. Reports she has a history of CLL.        She is on 1-2 L nc  No fever last night  S/P bronchoscopy with BAL               Current Medications:    Current Facility-Administered Medications: Isavuconazonium Sulfate CAPS 372 mg, 372 mg, Oral, Q8H **FOLLOWED BY** [START ON 2018] Isavuconazonium Sulfate CAPS 372 mg, 372 mg, Oral, Daily  levofloxacin (LEVAQUIN) 750 MG/150ML infusion 750 mg, 750 mg, Intravenous, Q24H  lactobacillus (CULTURELLE) capsule 1 capsule, 1 capsule, Oral, BID WC  linezolid (ZYVOX) IVPB 600 mg, 600 mg, Intravenous, Q12H  FLUoxetine (PROZAC) capsule 10 mg, 10 mg, Oral, Daily  piperacillin-tazobactam (ZOSYN) 3.375 g in dextrose 50 mL IVPB extended infusion (premix), 3.375 g, Intravenous, Q8H  ibuprofen (ADVIL;MOTRIN) tablet 400 mg, 400 mg, Oral, Q6H PRN  oxyCODONE-acetaminophen (PERCOCET)  MG per tablet 1 tablet, 1 tablet, Oral, Q4H  ALPRAZolam (XANAX) tablet 2 mg, 2 mg, Oral, TID PRN  amLODIPine (NORVASC) tablet 10 mg, 10 mg, Oral, Daily  pravastatin (PRAVACHOL) tablet 20 mg, 20 mg, Oral, Daily  docusate sodium (COLACE) capsule 100 mg, 100 mg, Oral, Daily  ondansetron (ZOFRAN-ODT) disintegrating tablet 4 mg, 4 mg, Oral, Q8H PRN  potassium bicarb-citric acid (EFFER-K) effervescent tablet 20 mEq, 20 mEq, Oral, BID  0.9 % sodium chloride infusion, , Intravenous, Continuous  sodium chloride flush 0.9 % injection 10 mL, 10 mL, Intravenous, 2 times per day  sodium chloride flush 0.9 % injection 10 mL, 10 mL, Intravenous, PRN  magnesium hydroxide (MILK OF MAGNESIA) 400 MG/5ML suspension 30 mL, 30 mL, Oral, Daily PRN  ondansetron (ZOFRAN) injection 4 mg, 4 mg, Intravenous, Q6H PRN  enoxaparin (LOVENOX) injection 40 mg, 40 mg, Subcutaneous, Nightly  potassium chloride (KLOR-CON M) extended release tablet 40 mEq, 40 mEq, Oral, PRN **OR** potassium bicarb-citric acid (EFFER-K) effervescent tablet 40 mEq, 40 mEq, Oral, PRN **OR** potassium chloride 10 mEq/100 mL IVPB (Peripheral Line), 10 mEq, Intravenous, PRN  pantoprazole (PROTONIX) tablet 40 mg, 40 mg, Oral, QAM AC  influenza quadrivalent split vaccine (FLUZONE;FLUARIX;FLULAVAL;AFLURIA) injection 0.5 mL, 0.5 mL, Intramuscular, Once  lidocaine-prilocaine (EMLA) cream, , Topical, PRN  ipratropium-albuterol (DUONEB) nebulizer solution 1 ampule, 1 ampule, Inhalation, Q4H PRN  ipratropium-albuterol (DUONEB) nebulizer solution 1 ampule, 1 ampule, Inhalation, BID  gabapentin (NEURONTIN) capsule 600 mg, 600 mg, Oral, TID  guaiFENesin-codeine (GUAIFENESIN AC) 100-10 MG/5ML liquid 5 mL, 5 mL, Oral, Q4H PRN  furosemide (LASIX) tablet 20 mg, 20 mg, Oral, Daily  acetaminophen (TYLENOL) tablet 650 mg, 650 mg, Oral, Q4H PRN  promethazine (PHENERGAN) injection 12.5 mg, 12.5 mg, Intravenous, Q6H PRN    REVIEW OF SYSTEMS:    CONSTITUTIONAL: positive for fevers, no chills, diaphoresis, activity change, appetite change, fatigue, night sweats and unexpected weight change.    EYES:  negative for blurred vision, eye discharge, visual disturbance and icterus  HEENT:  negative for hearing loss, tinnitus, ear drainage, sinus pressure, nasal congestion, epistaxis and snoring  RESPIRATORY:  See HPI  CARDIOVASCULAR:  negative for chest pain, palpitations, exertional chest pressure/discomfort, edema, syncope  GASTROINTESTINAL:  negative for nausea, vomiting, diarrhea, constipation, blood in stool and abdominal pain  GENITOURINARY:  negative for frequency, dysuria, urinary incontinence, decreased urine volume, and hematuria  HEMATOLOGIC/LYMPHATIC:  negative for easy bruising, bleeding and lymphadenopathy  ALLERGIC/IMMUNOLOGIC:  negative for recurrent infections, angioedema, anaphylaxis and drug reactions  ENDOCRINE:  negative for weight changes and diabetic symptoms including polyuria, polydipsia and polyphagia  MUSCULOSKELETAL:  negative for  pain, joint swelling, decreased range of motion and muscle weakness  NEUROLOGICAL:  negative for headaches, slurred speech, unilateral weakness  PSYCHIATRIC/BEHAVIORAL: negative for hallucinations, behavioral problems, confusion and agitation. Objective:   PHYSICAL EXAM:      VITALS:  /69   Pulse 74   Temp 98.2 °F (36.8 °C) (Core)   Resp 13   Ht 5' 2\" (1.575 m)   Wt 182 lb 12.2 oz (82.9 kg)   SpO2 98%   BMI 33.43 kg/m²      24HR INTAKE/OUTPUT:      Intake/Output Summary (Last 24 hours) at 11/24/18 0946  Last data filed at 11/24/18 0848   Gross per 24 hour   Intake             3614 ml   Output             2700 ml   Net              914 ml     CONSTITUTIONAL:  awake, alert, cooperative, no apparent distress, and appears stated age  NECK:  Supple, symmetrical, trachea midline, no adenopathy, thyroid symmetric, not enlarged and no tenderness, skin normal  LUNGS: clear to auscultation. No accessory muscle use  CARDIOVASCULAR: S1 and S2, no edema and no JVD  ABDOMEN:  normal bowel sounds, non-distended and no masses palpated, and no tenderness to palpation. No hepatospleenomegaly  LYMPHADENOPATHY:  no axillary or supraclavicular adenopathy. No cervical adnenopathy  PSYCHIATRIC: Oriented to person place and time. No obvious depression or anxiety. MUSCULOSKELETAL: No obvious misalignment or effusion of the joints. No clubbing, cyanosis of the digits. SKIN:  normal skin color, texture, turgor and no redness, warmth, or swelling.  No palpable nodules    DATA:    Old records have been reviewed    CBC:  Recent Labs      11/23/18   0800  11/24/18   0447   WBC  4.2  4.8   RBC  3.32*  2.97*   HGB  10.5*  9.3*   HCT  31.6*  28.5*   PLT  365  343   MCV  95.2  96.2   MCH  31.6  31.3   MCHC  33.3  32.5   RDW  13.6  14.1      BMP:  Recent Labs      11/23/18 0800  11/24/18   0447   NA  143  142   K  3.6  3.9   CL  103  105   CO2  29  30   BUN  9  13   CREATININE  0.6  0.8   CALCIUM  8.3  8.2*   GLUCOSE  173*  98        Radiology Review:  All pertinent images / reports were reviewed as a part of this visit. Assessment:     1. CAP  2. Acute hypoxic respiratory failure  3. COPD  4.  Nicotine abuse    Plan:     - she is on 1-2L nc  - S/P bronchoscopy  - F/U on cultures  - continue abx per Dr. Osmani Gibson  - continue bronchodilators  - airway clearance  - OK to transfer out of ICU service, will follow

## 2018-11-24 NOTE — PROGRESS NOTES
Reassessment complete. See flowsheets for complete details. VSS. No acute changes noted. Pt bathed and repositioned back to chair.  at bedside. No needs expressed. Call light within reach.

## 2018-11-24 NOTE — PROGRESS NOTES
home treatment regimen frequency. 5. Bronchodilator(s) will be discontinued if patient has a RSI less than 9 and has received no scheduled or as needed treatment for 72  Hrs. Patients Ordered on a Mucolytic Agent:    1. Must always be administered with a bronchodilator. 2. Discontinue if patient experiences worsened bronchospasm, or secretions have lessened to the point that the patient is able to clear them with a cough. Anti-inflammatory and Combination Medications:    1. If the patient lacks prior history of lung disease, is not using inhaled anti-inflammatory medication at home, and lacks wheezing by examination or by history for at least 24 hours, contact physician for possible discontinuation.

## 2018-11-24 NOTE — PROGRESS NOTES
mg, Oral, Daily  docusate sodium (COLACE) capsule 100 mg, 100 mg, Oral, Daily  ondansetron (ZOFRAN-ODT) disintegrating tablet 4 mg, 4 mg, Oral, Q8H PRN  potassium bicarb-citric acid (EFFER-K) effervescent tablet 20 mEq, 20 mEq, Oral, BID  0.9 % sodium chloride infusion, , Intravenous, Continuous  sodium chloride flush 0.9 % injection 10 mL, 10 mL, Intravenous, 2 times per day  sodium chloride flush 0.9 % injection 10 mL, 10 mL, Intravenous, PRN  magnesium hydroxide (MILK OF MAGNESIA) 400 MG/5ML suspension 30 mL, 30 mL, Oral, Daily PRN  ondansetron (ZOFRAN) injection 4 mg, 4 mg, Intravenous, Q6H PRN  enoxaparin (LOVENOX) injection 40 mg, 40 mg, Subcutaneous, Nightly  potassium chloride (KLOR-CON M) extended release tablet 40 mEq, 40 mEq, Oral, PRN **OR** potassium bicarb-citric acid (EFFER-K) effervescent tablet 40 mEq, 40 mEq, Oral, PRN **OR** potassium chloride 10 mEq/100 mL IVPB (Peripheral Line), 10 mEq, Intravenous, PRN  pantoprazole (PROTONIX) tablet 40 mg, 40 mg, Oral, QAM AC  influenza quadrivalent split vaccine (FLUZONE;FLUARIX;FLULAVAL;AFLURIA) injection 0.5 mL, 0.5 mL, Intramuscular, Once  lidocaine-prilocaine (EMLA) cream, , Topical, PRN  ipratropium-albuterol (DUONEB) nebulizer solution 1 ampule, 1 ampule, Inhalation, Q4H PRN  ipratropium-albuterol (DUONEB) nebulizer solution 1 ampule, 1 ampule, Inhalation, BID  gabapentin (NEURONTIN) capsule 600 mg, 600 mg, Oral, TID  guaiFENesin-codeine (GUAIFENESIN AC) 100-10 MG/5ML liquid 5 mL, 5 mL, Oral, Q4H PRN  furosemide (LASIX) tablet 20 mg, 20 mg, Oral, Daily  acetaminophen (TYLENOL) tablet 650 mg, 650 mg, Oral, Q4H PRN  promethazine (PHENERGAN) injection 12.5 mg, 12.5 mg, Intravenous, Q6H PRN    Physical      VITALS:  /64   Pulse 76   Temp 98.7 °F (37.1 °C) (Core)   Resp 13   Ht 5' 2\" (1.575 m)   Wt 182 lb 12.2 oz (82.9 kg)   SpO2 93%   BMI 33.43 kg/m²   TEMPERATURE:  Current - Temp: 98.7 °F (37.1 °C);  Max - Temp  Av.9 °F (36.6 °C)  Min: 97.1 °F (36.2 °C)  Max: 98.7 °F (37.1 °C)  RESPIRATIONS RANGE: Resp  Av.6  Min: 10  Max: 22  PULSE RANGE: Pulse  Av  Min: 68  Max: 85  BLOOD PRESSURE RANGE:  Systolic (32XYM), CTK:594 , Min:77 , XHW:064   ; Diastolic (41UQZ), BPX:10, Min:35, Max:69    PULSE OXIMETRY RANGE: SpO2  Av.9 %  Min: 93 %  Max: 99 %  CONSTITUTIONAL:  fatigued, alert, cooperative, severe distress, appears older than stated age and moderately obese  EYES:  Unremarkable   NECK:  Mild JVD , supple, symmetrical, trachea midline and skin normal  BACK:  symmetric and no curvature  LUNGS:  tachypneic, Moderate respiratory distress, moderate air exchange, no retractions and crackles diffuse, rhonchi diffuse  CARDIOVASCULAR:  normal apical pulses, regular rate and rhythm, normal S1 and S2 and no S3  ABDOMEN:  Soft BS + non tender   MUSCULOSKELETAL:  Trace edema   NEUROLOGIC:  Confused , no acute focal sensory motor deficit   SKIN:  Warm and dry  and no bruising or bleeding    Data      No results found for: PHART, PO2ART, EOL4BEV, VCX8AWD, BEART, C2KJFHRY    Lab Results   Component Value Date     2018    K 3.9 2018    K 3.6 2018     2018    CO2 30 2018    BUN 13 2018    CREATININE 0.8 2018    GLUCOSE 98 2018    GLUCOSE 119 2015    CALCIUM 8.2 2018     Lab Results   Component Value Date    WBC 4.8 2018    HGB 9.3 2018    HCT 28.5 2018    MCV 96.2 2018     2018         Lab Results   Component Value Date    INR 1.53 (H) 2018    PROTIME 17.4 (H) 2018       [facility] Problems    Diagnosis Date Noted    Community acquired pneumonia [J18.9]     COPD, severity to be determined ([facility] [address] [J44.9]     Nicotine abuse [Z72.0]     Respiratory distress [R06.03]     High fever [R50.9]     Septicemia ([address] [A41.9]     Immunocompromised patient ([facility] [D84.9]     Acute respiratory failure with hypoxia

## 2018-11-25 LAB
ADENOVIRUS SPECIES BE: NOT DETECTED
ADENOVIRUS SPECIES C: NOT DETECTED
ANION GAP SERPL CALCULATED.3IONS-SCNC: 9 MMOL/L (ref 3–16)
ASPERGILLUS GALACTO AG: NEGATIVE
ASPERGILLUS GALACTO INDEX: 0.04
BASOPHILS ABSOLUTE: 0 K/UL (ref 0–0.2)
BASOPHILS RELATIVE PERCENT: 0.9 %
BUN BLDV-MCNC: 13 MG/DL (ref 7–20)
CALCIUM SERPL-MCNC: 7.9 MG/DL (ref 8.3–10.6)
CHLORIDE BLD-SCNC: 105 MMOL/L (ref 99–110)
CO2: 30 MMOL/L (ref 21–32)
CREAT SERPL-MCNC: 0.8 MG/DL (ref 0.6–1.2)
CULTURE, RESPIRATORY: NORMAL
CULTURE, RESPIRATORY: NORMAL
EOSINOPHILS ABSOLUTE: 0.1 K/UL (ref 0–0.6)
EOSINOPHILS RELATIVE PERCENT: 1.4 %
GFR AFRICAN AMERICAN: >60
GFR NON-AFRICAN AMERICAN: >60
GLUCOSE BLD-MCNC: 79 MG/DL (ref 70–99)
GRAM STAIN RESULT: NORMAL
GRAM STAIN RESULT: NORMAL
HCT VFR BLD CALC: 27.8 % (ref 36–48)
HEMOGLOBIN: 9.2 G/DL (ref 12–16)
HISTOPLASMA ANTIGEN URINE INTERP: NOT DETECTED
HISTOPLASMA ANTIGEN URINE: NOT DETECTED NG/ML
HUMAN METAPNEUMOVIRUS PCR: NOT DETECTED
INFLUENZA A BY PCR: NOT DETECTED
INFLUENZA A H1 (2009) PCR: NOT DETECTED
INFLUENZA A H1 (2009) PCR: NOT DETECTED
INFLUENZA A H3 PCR: NOT DETECTED
INFLUENZA B BY PCR: NOT DETECTED
LYMPHOCYTES ABSOLUTE: 1.1 K/UL (ref 1–5.1)
LYMPHOCYTES RELATIVE PERCENT: 24.8 %
MCH RBC QN AUTO: 31.9 PG (ref 26–34)
MCHC RBC AUTO-ENTMCNC: 33.1 G/DL (ref 31–36)
MCV RBC AUTO: 96.4 FL (ref 80–100)
MISCELLANEOUS LAB TEST ORDER: ABNORMAL
MONOCYTES ABSOLUTE: 0.4 K/UL (ref 0–1.3)
MONOCYTES RELATIVE PERCENT: 8.6 %
MTB COMPLEX INTERP: NOT DETECTED
MTB RIFAMPIN BY PCR: NORMAL
MTBRIF SOURCE: NORMAL
MYCOBACTERIUM TUBERCULOSIS PCR: NOT DETECTED
NEUTROPHILS ABSOLUTE: 2.9 K/UL (ref 1.7–7.7)
NEUTROPHILS RELATIVE PERCENT: 64.3 %
PARAINFLUENZA 2: NOT DETECTED
PARAINFLUENZA 3: NOT DETECTED
PARAINFLUENZA1: NOT DETECTED
PDW BLD-RTO: 14 % (ref 12.4–15.4)
PLATELET # BLD: 344 K/UL (ref 135–450)
PMV BLD AUTO: 7.8 FL (ref 5–10.5)
PNEUMOCYSTIS JIROVECI DFA: NEGATIVE
PNEUMOCYSTIS SOURCE: NORMAL
POTASSIUM SERPL-SCNC: 3.8 MMOL/L (ref 3.5–5.1)
RBC # BLD: 2.88 M/UL (ref 4–5.2)
RHINOVIRUS RNA XXX PCR: NOT DETECTED
RSV A AB BY PCR: NOT DETECTED
RSV B AB BY PCR: NOT DETECTED
RVP SOURCE: NORMAL
SODIUM BLD-SCNC: 144 MMOL/L (ref 136–145)
WBC # BLD: 4.5 K/UL (ref 4–11)

## 2018-11-25 PROCEDURE — 2580000003 HC RX 258: Performed by: INTERNAL MEDICINE

## 2018-11-25 PROCEDURE — 2700000000 HC OXYGEN THERAPY PER DAY

## 2018-11-25 PROCEDURE — 6370000000 HC RX 637 (ALT 250 FOR IP): Performed by: INTERNAL MEDICINE

## 2018-11-25 PROCEDURE — 6360000002 HC RX W HCPCS: Performed by: INTERNAL MEDICINE

## 2018-11-25 PROCEDURE — 94668 MNPJ CHEST WALL SBSQ: CPT

## 2018-11-25 PROCEDURE — 87449 NOS EACH ORGANISM AG IA: CPT

## 2018-11-25 PROCEDURE — 94667 MNPJ CHEST WALL 1ST: CPT

## 2018-11-25 PROCEDURE — 1200000000 HC SEMI PRIVATE

## 2018-11-25 PROCEDURE — 2500000003 HC RX 250 WO HCPCS: Performed by: INTERNAL MEDICINE

## 2018-11-25 PROCEDURE — 94760 N-INVAS EAR/PLS OXIMETRY 1: CPT

## 2018-11-25 PROCEDURE — 87798 DETECT AGENT NOS DNA AMP: CPT

## 2018-11-25 PROCEDURE — 80048 BASIC METABOLIC PNL TOTAL CA: CPT

## 2018-11-25 PROCEDURE — 94640 AIRWAY INHALATION TREATMENT: CPT

## 2018-11-25 PROCEDURE — 99233 SBSQ HOSP IP/OBS HIGH 50: CPT | Performed by: INTERNAL MEDICINE

## 2018-11-25 PROCEDURE — 87541 LEGION PNEUMO DNA AMP PROB: CPT

## 2018-11-25 PROCEDURE — 85025 COMPLETE CBC W/AUTO DIFF WBC: CPT

## 2018-11-25 PROCEDURE — 87324 CLOSTRIDIUM AG IA: CPT

## 2018-11-25 RX ORDER — SODIUM CHLORIDE 9 MG/ML
INJECTION, SOLUTION INTRAVENOUS
Status: DISPENSED
Start: 2018-11-25 | End: 2018-11-25

## 2018-11-25 RX ADMIN — TAZOBACTAM SODIUM AND PIPERACILLIN SODIUM 3.38 G: 375; 3 INJECTION, SOLUTION INTRAVENOUS at 12:14

## 2018-11-25 RX ADMIN — Medication 1 CAPSULE: at 10:01

## 2018-11-25 RX ADMIN — GABAPENTIN 600 MG: 300 CAPSULE ORAL at 13:08

## 2018-11-25 RX ADMIN — POTASSIUM BICARBONATE 20 MEQ: 782 TABLET, EFFERVESCENT ORAL at 19:55

## 2018-11-25 RX ADMIN — Medication 1 CAPSULE: at 17:03

## 2018-11-25 RX ADMIN — Medication 10 ML: at 10:02

## 2018-11-25 RX ADMIN — OXYCODONE HYDROCHLORIDE AND ACETAMINOPHEN 1 TABLET: 10; 325 TABLET ORAL at 10:01

## 2018-11-25 RX ADMIN — FLUOXETINE HYDROCHLORIDE 10 MG: 10 CAPSULE ORAL at 10:01

## 2018-11-25 RX ADMIN — PROMETHAZINE HYDROCHLORIDE 12.5 MG: 25 INJECTION INTRAMUSCULAR; INTRAVENOUS at 13:05

## 2018-11-25 RX ADMIN — Medication 10 ML: at 19:54

## 2018-11-25 RX ADMIN — AMLODIPINE BESYLATE 10 MG: 5 TABLET ORAL at 10:00

## 2018-11-25 RX ADMIN — PANTOPRAZOLE SODIUM 40 MG: 40 TABLET, DELAYED RELEASE ORAL at 05:41

## 2018-11-25 RX ADMIN — FUROSEMIDE 20 MG: 20 TABLET ORAL at 10:01

## 2018-11-25 RX ADMIN — OXYCODONE HYDROCHLORIDE AND ACETAMINOPHEN 1 TABLET: 10; 325 TABLET ORAL at 17:03

## 2018-11-25 RX ADMIN — PRAVASTATIN SODIUM 20 MG: 20 TABLET ORAL at 10:01

## 2018-11-25 RX ADMIN — OXYCODONE HYDROCHLORIDE AND ACETAMINOPHEN 1 TABLET: 10; 325 TABLET ORAL at 13:08

## 2018-11-25 RX ADMIN — LEVOFLOXACIN 750 MG: 5 INJECTION, SOLUTION INTRAVENOUS at 10:02

## 2018-11-25 RX ADMIN — ENOXAPARIN SODIUM 40 MG: 40 INJECTION SUBCUTANEOUS at 19:54

## 2018-11-25 RX ADMIN — LINEZOLID 600 MG: 600 INJECTION, SOLUTION INTRAVENOUS at 05:41

## 2018-11-25 RX ADMIN — IPRATROPIUM BROMIDE AND ALBUTEROL SULFATE 1 AMPULE: .5; 3 SOLUTION RESPIRATORY (INHALATION) at 08:04

## 2018-11-25 RX ADMIN — TAZOBACTAM SODIUM AND PIPERACILLIN SODIUM 3.38 G: 375; 3 INJECTION, SOLUTION INTRAVENOUS at 19:54

## 2018-11-25 RX ADMIN — IPRATROPIUM BROMIDE AND ALBUTEROL SULFATE 1 AMPULE: .5; 3 SOLUTION RESPIRATORY (INHALATION) at 20:21

## 2018-11-25 RX ADMIN — OXYCODONE HYDROCHLORIDE AND ACETAMINOPHEN 1 TABLET: 10; 325 TABLET ORAL at 04:17

## 2018-11-25 RX ADMIN — GABAPENTIN 600 MG: 300 CAPSULE ORAL at 10:02

## 2018-11-25 RX ADMIN — SODIUM CHLORIDE: 9 INJECTION, SOLUTION INTRAVENOUS at 04:20

## 2018-11-25 RX ADMIN — POTASSIUM BICARBONATE 20 MEQ: 782 TABLET, EFFERVESCENT ORAL at 10:01

## 2018-11-25 RX ADMIN — LINEZOLID 600 MG: 600 INJECTION, SOLUTION INTRAVENOUS at 17:03

## 2018-11-25 RX ADMIN — OXYCODONE HYDROCHLORIDE AND ACETAMINOPHEN 1 TABLET: 10; 325 TABLET ORAL at 19:54

## 2018-11-25 RX ADMIN — ISAVUCONAZONIUM SULFATE 372 MG: 186 CAPSULE ORAL at 10:01

## 2018-11-25 RX ADMIN — GABAPENTIN 600 MG: 300 CAPSULE ORAL at 19:53

## 2018-11-25 RX ADMIN — TAZOBACTAM SODIUM AND PIPERACILLIN SODIUM 3.38 G: 375; 3 INJECTION, SOLUTION INTRAVENOUS at 00:14

## 2018-11-25 ASSESSMENT — PAIN DESCRIPTION - ONSET
ONSET: ON-GOING

## 2018-11-25 ASSESSMENT — PAIN SCALES - GENERAL
PAINLEVEL_OUTOF10: 5
PAINLEVEL_OUTOF10: 8
PAINLEVEL_OUTOF10: 4
PAINLEVEL_OUTOF10: 8
PAINLEVEL_OUTOF10: 0
PAINLEVEL_OUTOF10: 8
PAINLEVEL_OUTOF10: 5
PAINLEVEL_OUTOF10: 3
PAINLEVEL_OUTOF10: 5

## 2018-11-25 ASSESSMENT — PAIN DESCRIPTION - PAIN TYPE
TYPE: CHRONIC PAIN

## 2018-11-25 ASSESSMENT — PAIN DESCRIPTION - DESCRIPTORS
DESCRIPTORS: DISCOMFORT;DULL
DESCRIPTORS: DISCOMFORT

## 2018-11-25 ASSESSMENT — PAIN DESCRIPTION - PROGRESSION: CLINICAL_PROGRESSION: NOT CHANGED

## 2018-11-25 ASSESSMENT — PAIN DESCRIPTION - LOCATION
LOCATION: GENERALIZED
LOCATION: ABDOMEN;GENERALIZED
LOCATION: ABDOMEN

## 2018-11-25 ASSESSMENT — PAIN DESCRIPTION - FREQUENCY
FREQUENCY: CONTINUOUS

## 2018-11-25 ASSESSMENT — PAIN DESCRIPTION - ORIENTATION: ORIENTATION: MID

## 2018-11-25 NOTE — PROGRESS NOTES
Intravenous, Q6H PRN  enoxaparin (LOVENOX) injection 40 mg, 40 mg, Subcutaneous, Nightly  potassium chloride (KLOR-CON M) extended release tablet 40 mEq, 40 mEq, Oral, PRN **OR** potassium bicarb-citric acid (EFFER-K) effervescent tablet 40 mEq, 40 mEq, Oral, PRN **OR** potassium chloride 10 mEq/100 mL IVPB (Peripheral Line), 10 mEq, Intravenous, PRN  pantoprazole (PROTONIX) tablet 40 mg, 40 mg, Oral, QAM AC  influenza quadrivalent split vaccine (FLUZONE;FLUARIX;FLULAVAL;AFLURIA) injection 0.5 mL, 0.5 mL, Intramuscular, Once  lidocaine-prilocaine (EMLA) cream, , Topical, PRN  ipratropium-albuterol (DUONEB) nebulizer solution 1 ampule, 1 ampule, Inhalation, Q4H PRN  ipratropium-albuterol (DUONEB) nebulizer solution 1 ampule, 1 ampule, Inhalation, BID  gabapentin (NEURONTIN) capsule 600 mg, 600 mg, Oral, TID  guaiFENesin-codeine (GUAIFENESIN AC) 100-10 MG/5ML liquid 5 mL, 5 mL, Oral, Q4H PRN  furosemide (LASIX) tablet 20 mg, 20 mg, Oral, Daily  acetaminophen (TYLENOL) tablet 650 mg, 650 mg, Oral, Q4H PRN  promethazine (PHENERGAN) injection 12.5 mg, 12.5 mg, Intravenous, Q6H PRN    REVIEW OF SYSTEMS:    CONSTITUTIONAL: positive for fevers, no chills, diaphoresis, activity change, appetite change, fatigue, night sweats and unexpected weight change.    EYES:  negative for blurred vision, eye discharge, visual disturbance and icterus  HEENT:  negative for hearing loss, tinnitus, ear drainage, sinus pressure, nasal congestion, epistaxis and snoring  RESPIRATORY:  See HPI  CARDIOVASCULAR:  negative for chest pain, palpitations, exertional chest pressure/discomfort, edema, syncope  GASTROINTESTINAL:  negative for nausea, vomiting, diarrhea, constipation, blood in stool and abdominal pain  GENITOURINARY:  negative for frequency, dysuria, urinary incontinence, decreased urine volume, and hematuria  HEMATOLOGIC/LYMPHATIC:  negative for easy bruising, bleeding and lymphadenopathy  ALLERGIC/IMMUNOLOGIC:  negative for recurrent

## 2018-11-25 NOTE — PROGRESS NOTES
20 mg, 20 mg, Oral, Daily  docusate sodium (COLACE) capsule 100 mg, 100 mg, Oral, Daily  ondansetron (ZOFRAN-ODT) disintegrating tablet 4 mg, 4 mg, Oral, Q8H PRN  potassium bicarb-citric acid (EFFER-K) effervescent tablet 20 mEq, 20 mEq, Oral, BID  sodium chloride flush 0.9 % injection 10 mL, 10 mL, Intravenous, 2 times per day  sodium chloride flush 0.9 % injection 10 mL, 10 mL, Intravenous, PRN  magnesium hydroxide (MILK OF MAGNESIA) 400 MG/5ML suspension 30 mL, 30 mL, Oral, Daily PRN  ondansetron (ZOFRAN) injection 4 mg, 4 mg, Intravenous, Q6H PRN  enoxaparin (LOVENOX) injection 40 mg, 40 mg, Subcutaneous, Nightly  potassium chloride (KLOR-CON M) extended release tablet 40 mEq, 40 mEq, Oral, PRN **OR** potassium bicarb-citric acid (EFFER-K) effervescent tablet 40 mEq, 40 mEq, Oral, PRN **OR** potassium chloride 10 mEq/100 mL IVPB (Peripheral Line), 10 mEq, Intravenous, PRN  pantoprazole (PROTONIX) tablet 40 mg, 40 mg, Oral, QAM AC  influenza quadrivalent split vaccine (FLUZONE;FLUARIX;FLULAVAL;AFLURIA) injection 0.5 mL, 0.5 mL, Intramuscular, Once  lidocaine-prilocaine (EMLA) cream, , Topical, PRN  ipratropium-albuterol (DUONEB) nebulizer solution 1 ampule, 1 ampule, Inhalation, Q4H PRN  ipratropium-albuterol (DUONEB) nebulizer solution 1 ampule, 1 ampule, Inhalation, BID  gabapentin (NEURONTIN) capsule 600 mg, 600 mg, Oral, TID  guaiFENesin-codeine (GUAIFENESIN AC) 100-10 MG/5ML liquid 5 mL, 5 mL, Oral, Q4H PRN  furosemide (LASIX) tablet 20 mg, 20 mg, Oral, Daily  acetaminophen (TYLENOL) tablet 650 mg, 650 mg, Oral, Q4H PRN  promethazine (PHENERGAN) injection 12.5 mg, 12.5 mg, Intravenous, Q6H PRN    Physical      VITALS:  BP (!) 103/59   Pulse 79   Temp 97.4 °F (36.3 °C) (Temporal)   Resp 17   Ht 5' 2\" (1.575 m)   Wt 185 lb 13.6 oz (84.3 kg)   SpO2 94%   BMI 33.99 kg/m²   TEMPERATURE:  Current - Temp: 97.4 °F (36.3 °C);  Max - Temp  Av.7 °F (36.5 °C)  Min: 97 °F (36.1 °C)  Max: 98.1 °F (36.7

## 2018-11-26 ENCOUNTER — APPOINTMENT (OUTPATIENT)
Dept: GENERAL RADIOLOGY | Age: 63
DRG: 853 | End: 2018-11-26
Payer: MEDICARE

## 2018-11-26 LAB
ANION GAP SERPL CALCULATED.3IONS-SCNC: 8 MMOL/L (ref 3–16)
ASPERGILLUS ANTIBODY CF: NORMAL
ASPERGILLUS GALACTO AG: NEGATIVE
ASPERGILLUS GALACTO INDEX: 0.05
BASOPHILS ABSOLUTE: 0 K/UL (ref 0–0.2)
BASOPHILS RELATIVE PERCENT: 1.1 %
BLASTOMYCES AB BY EIA, SERUM: 0.3 IV
BLOOD CULTURE, ROUTINE: NORMAL
BUN BLDV-MCNC: 12 MG/DL (ref 7–20)
C DIFFICILE TOXIN, EIA: NORMAL
CALCIUM SERPL-MCNC: 8.4 MG/DL (ref 8.3–10.6)
CHLAMYDIA PNEUMONIAE BY PCR: NOT DETECTED
CHLAMYDIA PNEUMONIAE SOURCE: NORMAL
CHLORIDE BLD-SCNC: 102 MMOL/L (ref 99–110)
CO2: 33 MMOL/L (ref 21–32)
COCCIDIOIDES ANTIBODY CF: NORMAL
CREAT SERPL-MCNC: 0.9 MG/DL (ref 0.6–1.2)
CULTURE, BLOOD 2: NORMAL
EOSINOPHILS ABSOLUTE: 0.1 K/UL (ref 0–0.6)
EOSINOPHILS RELATIVE PERCENT: 1.8 %
GFR AFRICAN AMERICAN: >60
GFR NON-AFRICAN AMERICAN: >60
GLUCOSE BLD-MCNC: 99 MG/DL (ref 70–99)
HCT VFR BLD CALC: 28.2 % (ref 36–48)
HEMOGLOBIN: 9.3 G/DL (ref 12–16)
HISTOPLASMA ANTIBODY MYCELIAL CF: NORMAL
HISTOPLASMA ANTIBODY YEAST CF: NORMAL
LYMPHOCYTES ABSOLUTE: 1.3 K/UL (ref 1–5.1)
LYMPHOCYTES RELATIVE PERCENT: 31.3 %
MCH RBC QN AUTO: 32 PG (ref 26–34)
MCHC RBC AUTO-ENTMCNC: 33.1 G/DL (ref 31–36)
MCV RBC AUTO: 96.6 FL (ref 80–100)
MONOCYTES ABSOLUTE: 0.4 K/UL (ref 0–1.3)
MONOCYTES RELATIVE PERCENT: 9.4 %
MYCOPLASMA PNEUMO SOURCE: ABNORMAL
MYCOPLASMA PNEUMONIAE PCR: DETECTED
NEUTROPHILS ABSOLUTE: 2.3 K/UL (ref 1.7–7.7)
NEUTROPHILS RELATIVE PERCENT: 56.4 %
PDW BLD-RTO: 13.8 % (ref 12.4–15.4)
PLATELET # BLD: 336 K/UL (ref 135–450)
PMV BLD AUTO: 8.1 FL (ref 5–10.5)
POTASSIUM SERPL-SCNC: 3.6 MMOL/L (ref 3.5–5.1)
RBC # BLD: 2.92 M/UL (ref 4–5.2)
SODIUM BLD-SCNC: 143 MMOL/L (ref 136–145)
WBC # BLD: 4.2 K/UL (ref 4–11)

## 2018-11-26 PROCEDURE — 94760 N-INVAS EAR/PLS OXIMETRY 1: CPT

## 2018-11-26 PROCEDURE — 1200000000 HC SEMI PRIVATE

## 2018-11-26 PROCEDURE — 71046 X-RAY EXAM CHEST 2 VIEWS: CPT

## 2018-11-26 PROCEDURE — 99232 SBSQ HOSP IP/OBS MODERATE 35: CPT | Performed by: INTERNAL MEDICINE

## 2018-11-26 PROCEDURE — 2500000003 HC RX 250 WO HCPCS: Performed by: INTERNAL MEDICINE

## 2018-11-26 PROCEDURE — 6360000002 HC RX W HCPCS: Performed by: INTERNAL MEDICINE

## 2018-11-26 PROCEDURE — 6370000000 HC RX 637 (ALT 250 FOR IP): Performed by: INTERNAL MEDICINE

## 2018-11-26 PROCEDURE — 80048 BASIC METABOLIC PNL TOTAL CA: CPT

## 2018-11-26 PROCEDURE — 94640 AIRWAY INHALATION TREATMENT: CPT

## 2018-11-26 PROCEDURE — 2580000003 HC RX 258: Performed by: INTERNAL MEDICINE

## 2018-11-26 PROCEDURE — 36591 DRAW BLOOD OFF VENOUS DEVICE: CPT

## 2018-11-26 PROCEDURE — 85025 COMPLETE CBC W/AUTO DIFF WBC: CPT

## 2018-11-26 PROCEDURE — 99233 SBSQ HOSP IP/OBS HIGH 50: CPT | Performed by: INTERNAL MEDICINE

## 2018-11-26 PROCEDURE — 94668 MNPJ CHEST WALL SBSQ: CPT

## 2018-11-26 RX ORDER — LOPERAMIDE HYDROCHLORIDE 2 MG/1
2 CAPSULE ORAL 4 TIMES DAILY PRN
Status: DISCONTINUED | OUTPATIENT
Start: 2018-11-26 | End: 2018-11-28 | Stop reason: HOSPADM

## 2018-11-26 RX ADMIN — ALPRAZOLAM 2 MG: 0.5 TABLET ORAL at 01:26

## 2018-11-26 RX ADMIN — TAZOBACTAM SODIUM AND PIPERACILLIN SODIUM 3.38 G: 375; 3 INJECTION, SOLUTION INTRAVENOUS at 11:48

## 2018-11-26 RX ADMIN — PRAVASTATIN SODIUM 20 MG: 20 TABLET ORAL at 07:38

## 2018-11-26 RX ADMIN — Medication 1 CAPSULE: at 17:41

## 2018-11-26 RX ADMIN — IPRATROPIUM BROMIDE AND ALBUTEROL SULFATE 1 AMPULE: .5; 3 SOLUTION RESPIRATORY (INHALATION) at 07:59

## 2018-11-26 RX ADMIN — POTASSIUM BICARBONATE 20 MEQ: 782 TABLET, EFFERVESCENT ORAL at 19:55

## 2018-11-26 RX ADMIN — Medication 10 ML: at 07:39

## 2018-11-26 RX ADMIN — LOPERAMIDE HYDROCHLORIDE 2 MG: 2 CAPSULE ORAL at 11:48

## 2018-11-26 RX ADMIN — ONDANSETRON 4 MG: 2 INJECTION INTRAMUSCULAR; INTRAVENOUS at 05:52

## 2018-11-26 RX ADMIN — GABAPENTIN 600 MG: 300 CAPSULE ORAL at 07:38

## 2018-11-26 RX ADMIN — OXYCODONE HYDROCHLORIDE AND ACETAMINOPHEN 1 TABLET: 10; 325 TABLET ORAL at 04:42

## 2018-11-26 RX ADMIN — Medication 1 CAPSULE: at 07:38

## 2018-11-26 RX ADMIN — GABAPENTIN 600 MG: 300 CAPSULE ORAL at 19:54

## 2018-11-26 RX ADMIN — FLUOXETINE HYDROCHLORIDE 10 MG: 10 CAPSULE ORAL at 07:38

## 2018-11-26 RX ADMIN — GABAPENTIN 600 MG: 300 CAPSULE ORAL at 14:15

## 2018-11-26 RX ADMIN — OXYCODONE HYDROCHLORIDE AND ACETAMINOPHEN 1 TABLET: 10; 325 TABLET ORAL at 00:11

## 2018-11-26 RX ADMIN — OXYCODONE HYDROCHLORIDE AND ACETAMINOPHEN 1 TABLET: 10; 325 TABLET ORAL at 11:47

## 2018-11-26 RX ADMIN — OXYCODONE HYDROCHLORIDE AND ACETAMINOPHEN 1 TABLET: 10; 325 TABLET ORAL at 23:58

## 2018-11-26 RX ADMIN — FUROSEMIDE 20 MG: 20 TABLET ORAL at 07:38

## 2018-11-26 RX ADMIN — TAZOBACTAM SODIUM AND PIPERACILLIN SODIUM 3.38 G: 375; 3 INJECTION, SOLUTION INTRAVENOUS at 05:51

## 2018-11-26 RX ADMIN — OXYCODONE HYDROCHLORIDE AND ACETAMINOPHEN 1 TABLET: 10; 325 TABLET ORAL at 17:40

## 2018-11-26 RX ADMIN — ALPRAZOLAM 2 MG: 0.5 TABLET ORAL at 19:55

## 2018-11-26 RX ADMIN — AMLODIPINE BESYLATE 10 MG: 5 TABLET ORAL at 07:38

## 2018-11-26 RX ADMIN — Medication 10 ML: at 19:55

## 2018-11-26 RX ADMIN — OXYCODONE HYDROCHLORIDE AND ACETAMINOPHEN 1 TABLET: 10; 325 TABLET ORAL at 07:38

## 2018-11-26 RX ADMIN — LEVOFLOXACIN 750 MG: 5 INJECTION, SOLUTION INTRAVENOUS at 07:41

## 2018-11-26 RX ADMIN — IPRATROPIUM BROMIDE AND ALBUTEROL SULFATE 1 AMPULE: .5; 3 SOLUTION RESPIRATORY (INHALATION) at 19:58

## 2018-11-26 RX ADMIN — ONDANSETRON 4 MG: 4 TABLET, ORALLY DISINTEGRATING ORAL at 11:48

## 2018-11-26 RX ADMIN — ENOXAPARIN SODIUM 40 MG: 40 INJECTION SUBCUTANEOUS at 19:54

## 2018-11-26 RX ADMIN — PANTOPRAZOLE SODIUM 40 MG: 40 TABLET, DELAYED RELEASE ORAL at 05:51

## 2018-11-26 RX ADMIN — LINEZOLID 600 MG: 600 INJECTION, SOLUTION INTRAVENOUS at 04:40

## 2018-11-26 RX ADMIN — LOPERAMIDE HYDROCHLORIDE 2 MG: 2 CAPSULE ORAL at 19:55

## 2018-11-26 RX ADMIN — POTASSIUM BICARBONATE 20 MEQ: 782 TABLET, EFFERVESCENT ORAL at 07:38

## 2018-11-26 RX ADMIN — ISAVUCONAZONIUM SULFATE 372 MG: 186 CAPSULE ORAL at 07:40

## 2018-11-26 ASSESSMENT — PAIN SCALES - GENERAL
PAINLEVEL_OUTOF10: 5
PAINLEVEL_OUTOF10: 8
PAINLEVEL_OUTOF10: 8
PAINLEVEL_OUTOF10: 0
PAINLEVEL_OUTOF10: 5
PAINLEVEL_OUTOF10: 2
PAINLEVEL_OUTOF10: 0
PAINLEVEL_OUTOF10: 8
PAINLEVEL_OUTOF10: 0
PAINLEVEL_OUTOF10: 3
PAINLEVEL_OUTOF10: 8

## 2018-11-26 ASSESSMENT — ENCOUNTER SYMPTOMS
PHOTOPHOBIA: 0
TROUBLE SWALLOWING: 0
SHORTNESS OF BREATH: 1
CHEST TIGHTNESS: 0
RHINORRHEA: 0
EYE REDNESS: 0
STRIDOR: 0
COLOR CHANGE: 0
BLOOD IN STOOL: 0
DIARRHEA: 0
EYE DISCHARGE: 0
CHOKING: 0
APNEA: 0
COUGH: 0
ABDOMINAL PAIN: 0
FACIAL SWELLING: 0
NAUSEA: 0

## 2018-11-26 ASSESSMENT — PAIN DESCRIPTION - ORIENTATION
ORIENTATION: MID;LOWER
ORIENTATION: MID

## 2018-11-26 ASSESSMENT — PAIN DESCRIPTION - ONSET
ONSET: ON-GOING

## 2018-11-26 ASSESSMENT — PAIN DESCRIPTION - DESCRIPTORS
DESCRIPTORS: DISCOMFORT;CRAMPING;DULL

## 2018-11-26 ASSESSMENT — PAIN DESCRIPTION - FREQUENCY
FREQUENCY: CONTINUOUS

## 2018-11-26 ASSESSMENT — PAIN DESCRIPTION - PAIN TYPE
TYPE: CHRONIC PAIN

## 2018-11-26 ASSESSMENT — PAIN DESCRIPTION - PROGRESSION
CLINICAL_PROGRESSION: NOT CHANGED

## 2018-11-26 ASSESSMENT — PAIN DESCRIPTION - LOCATION
LOCATION: ABDOMEN;GENERALIZED

## 2018-11-26 NOTE — PLAN OF CARE
Problem: Airway Clearance - Ineffective:  Goal: Clear lung sounds  Clear lung sounds   Outcome: Ongoing  Crackles noted in bilateral lung bases. O2sats remain stable on RA at this time. Sats stable with ambulation. Pt with moist, non-productive cough    Problem: Pain:  Goal: Control of chronic pain  Control of chronic pain   Outcome: Ongoing  Provided patient with pain medicine per request at this time for chronic abdominal/generalized pain. Patient is using scheduled percocet for pain management. Pain will be reassessed within 30-60 minutes of pain medication administration. Problem: Falls - Risk of:  Goal: Will remain free from falls  Will remain free from falls   Outcome: Ongoing  Patient is a medium fall risk. Patient free of falls this shift. Bed low and locked at all times. Call light and bedside table is within reach. Discussed with patient the need to call out for assistance before getting up when if needed. Patient verbalized understanding.

## 2018-11-26 NOTE — PROGRESS NOTES
Shift assessment complete, see flowsheets. A/O x4. VSS and afebrile. Pt remains on RA at this time, O2sats remains stable. Crackles noted to bilateral lung bases. Pt denies SOB/dyspnea at this time. Pt assisted to bathroom with walker, loose BM noted. Colace given 11/24, will send Cdiff sample if stool continues with frequency and consistency. O2sats remained stable with ambulation. Evening medications given per MAR. Patient is resting in bed at this time. Reviewed POC, all questions answered. Denies additional needs. Call light is in reach, encouraged to call for assistance when needed. Pt is a medium fall risk. Will continue to monitor.  Raissa Haywood 9:38 PM

## 2018-11-26 NOTE — PROGRESS NOTES
Shift assessment complete; see flowsheets. VSS and afebrile. C/o chronic pain- see MAR. Up to [address] [place] [address] using walker, independently cleans self up, BM and urination. Hemorrhoids are oozing and uncomfortable, special wipes provided to protect skin. Wants to sleep a little longer this AM before getting into chair. Denies further needs. Updated on POC.

## 2018-11-26 NOTE — PROGRESS NOTES
doseswere reviewed by me today    Recent Abx Admin                   levofloxacin (LEVAQUIN) 750 MG/150ML infusion 750 mg (mg) 750 mg New Bag 11/26/18 0741    Isavuconazonium Sulfate CAPS 372 mg (mg) 372 mg Given 11/26/18 0740    piperacillin-tazobactam (ZOSYN) 3.375 g in dextrose 50 mL IVPB extended infusion (premix) (g) 3.375 g New Bag 11/26/18 0551     3.375 g New Bag 11/25/18 1954    linezolid (ZYVOX) IVPB 600 mg (mg) 600 mg New Bag 11/26/18 0440     600 mg New Bag 11/25/18 1703    piperacillin-tazobactam (ZOSYN) 3.375 g in dextrose 50 mL IVPB extended infusion (premix) (g) 3.375 g New Bag 11/25/18 1214                Known drug Allergies: All allergies were reviewed and updated    Allergies   Allergen Reactions    Toradol [Ketorolac Tromethamine] Anaphylaxis and Nausea Only     \"doesn't help anyway. \"     Acetaminophen     Haldol [Haloperidol Lactate]     Morphine      \"just doesn't work\"    Other     Prochlorperazine Maleate     Sulfamethoxazole-Trimethoprim Itching    Sumatriptan Other (See Comments)     From 2/25/2008 home med. Reconciliation record. No reaction noted. This entry to replace \"other-some other migraine meds\"    Trazodone Other (See Comments)     Nightmares    Vistaril [Hydroxyzine Hcl]     Biaxin [Clarithromycin] Nausea And Vomiting    Butorphanol Rash    Tramadol Rash       Microbiology: I have reviewed all available micro lab data andcultures    · Blood culture (2/2) - collected on 11/18/2018 : Negative so far  ·   BAL culture: Collected on 11/23/18. In process    11/26/2018  8:12 AM - Leticia Stevenson Incoming Lab Results From Soft (Epic Adt)     Component Results     Component Collected Lab   Mycoplasma pneumo by PCR  (Abnormal) 11/23/2018 11:30 AM [facility]   Detected     Comment:   INTERPRETIVE INFORMATION: Mycoplasma pneumoniae by PCR   Test developed and characteristics determined by Newton Jeffrey.  See   Compliance Statement B: [email or web address]   Performed by Newton Jeffrey,   500 addressed in a satisfactory manner and patient verbalized understanding all instructions. TIME SPENT TODAY:     - Spent over  36  minutes on visit (including interval history, physical exam, review of data including labs, cultures, imaging, development and implementation of treatment plan and coordination of complex care). - Over 50% of time spent with patient face to face on counseling and education. Please note that this chart was generated using Dragon dictation software. Although every effort was made to ensure the accuracy of this automated transcription, some errors in transcription may have occurred inadvertently. If you may need any clarification, please do not hesitate to contact me through EPIC or at the phone number provided below with my electronic signature. Thank you for involving me in the care of your patient. I will continue to follow. If you have any additional questions, please do not hesitate to contact me.     Rand Astudillo MD, MPH  11/26/2018, 11:49 AM  [facility]   Office: [phone]  Fax: [phone]  Tuesday AM clinic:   [address] [facility], [facility] [facility]  Thursday AM clinic: [address] [facility] [place]

## 2018-11-26 NOTE — CARE COORDINATION
[address] Transitions Interview     2018    Patient: Aurelia Malik Patient : 1955   MRN: [id number]  Reason for Admission: There are no discharge diagnoses documented for the most recent discharge. RARS: Readmission Risk Score: 12       Spoke with: Aurelia Malik      Readmission Risk  Patient Active Problem List   Diagnosis    DVT (deep venous thrombosis) (HCC)    Tobacco abuse    Intra-abdominal lymphadenopathy    Hypokalemia    Anxiety and depression    GERD (gastroesophageal reflux disease)    HTN (hypertension)    HLD (hyperlipidemia)    Chronic back pain    Degenerative disc disease, lumbar    CLL (chronic lymphocytic leukemia) (HCC)    Malignant lymphoma, small lymphocytic ([address]    Pneumonia due to organism    Septicemia ([address]    Immunocompromised patient ([address]    Acute respiratory failure with hypoxia ([address]    Status post chemotherapy    Community acquired pneumonia    COPD, severity to be determined ([address]    Nicotine abuse    Respiratory distress    High fever       Inpatient Assessment  Care Transitions Summary    Care Transitions Inpatient Review  Medication Review  Are you able to afford your medications?:  Yes  How often do you have difficulty taking your medications?:  I always take them as prescribed. Housing Review  Who do you live with?:  Partner/Spouse/SO  Are you an active caregiver in your home?:  No  Social Support  Do you have a ?:  No  Do you have a [address] [place] [address]?:  No  Durable Medical Equipment  Patient DME:  Straight cane  Functional Review  Ability to seek help/take action for Emergent/Urgent situations i.e. fire, crime, inclement weather or health crisis. :  Independent  Ability handle personal hygiene needs (bathing/dressing/grooming): Independent  Ability to manage medications: Independent  Ability to prepare food:  Independent  Ability to maintain home (clean home, laundry):   Independent  Ability to drive

## 2018-11-26 NOTE — PROGRESS NOTES
(KLOR-CON M) extended release tablet 40 mEq, 40 mEq, Oral, PRN **OR** potassium bicarb-citric acid (EFFER-K) effervescent tablet 40 mEq, 40 mEq, Oral, PRN **OR** potassium chloride 10 mEq/100 mL IVPB (Peripheral Line), 10 mEq, Intravenous, PRN  pantoprazole (PROTONIX) tablet 40 mg, 40 mg, Oral, QAM AC  influenza quadrivalent split vaccine (FLUZONE;FLUARIX;FLULAVAL;AFLURIA) injection 0.5 mL, 0.5 mL, Intramuscular, Once  lidocaine-prilocaine (EMLA) cream, , Topical, PRN  ipratropium-albuterol (DUONEB) nebulizer solution 1 ampule, 1 ampule, Inhalation, Q4H PRN  ipratropium-albuterol (DUONEB) nebulizer solution 1 ampule, 1 ampule, Inhalation, BID  gabapentin (NEURONTIN) capsule 600 mg, 600 mg, Oral, TID  guaiFENesin-codeine (GUAIFENESIN AC) 100-10 MG/5ML liquid 5 mL, 5 mL, Oral, Q4H PRN  furosemide (LASIX) tablet 20 mg, 20 mg, Oral, Daily  acetaminophen (TYLENOL) tablet 650 mg, 650 mg, Oral, Q4H PRN  promethazine (PHENERGAN) injection 12.5 mg, 12.5 mg, Intravenous, Q6H PRN    REVIEW OF SYSTEMS:    As per interval history and the rest of the 14 point systems were sought and noted to be negative    Objective:   PHYSICAL EXAM:      VITALS:  /70   Pulse 84   Temp 97.4 °F (36.3 °C) (Temporal)   Resp 16   Ht 5' 2\" (1.575 m)   Wt 185 lb 10 oz (84.2 kg)   SpO2 93%   BMI 33.95 kg/m²      24HR INTAKE/OUTPUT:      Intake/Output Summary (Last 24 hours) at 11/26/18 1547  Last data filed at 11/26/18 1236   Gross per 24 hour   Intake           1631.1 ml   Output                0 ml   Net           1631.1 ml     CONSTITUTIONAL:  awake, alert, cooperative, no apparent distress, and appears stated age  NECK:  Supple, symmetrical, trachea midline, no adenopathy, thyroid symmetric, not enlarged and no tenderness, skin normal  LUNGS: Scattered crackles, no wheezes.  No accessory muscle use  CARDIOVASCULAR: S1 and S2, no edema and no JVD  ABDOMEN:  normal bowel sounds, non-distended and no masses palpated, and no tenderness to

## 2018-11-26 NOTE — CARE COORDINATION
Discharge planning-    Spoke with the patient re: discharge planning needs. Discussed PT/OT recommendation for home care at discharge. Patient denies home care needs, patient also denies the need for a visiting nurse. Patient identifies no other needs at this time. Plan- Home with family. Patient denies home care needs.     Will continue to follow for support and discharge planning.    -Natalie Reyna, MSW, LSW

## 2018-11-26 NOTE — PROGRESS NOTES
Progress Note - Dr. Swetha Thomson - Internal Medicine  PCP: Luis San MD None None    Hospital Day: 8  Code Status: Full Code  Current Diet: DIET GENERAL; No Drinking Straw        CC: follow up on medical issues    Subjective:   Iban Hernández is a 66 y.o. female. She denies problems    Doing ok  Breathing easier  On room air, is comfortable    She denies chest pain, denies shortness of breath, denies nausea,  denies emesis. 10 system Review of Systems is reviewed with patient, and pertinent positives are listed here: None . Otherwise, Review of systems is negative. I have reviewed the patient's medical and social history in detail and updated the computerized patient record. To recap: She  has a past medical history of Allergic; Anxiety; Back pain; Cancer ([facility] Depression; Glaucoma; Hyperlipidemia; and Hypertension. . She  has a past surgical history that includes  section; Hysterectomy; Knee arthroscopy; Appendectomy; Colonoscopy; Upper gastrointestinal endoscopy (2017); ERCP (N/A, 09/15/2017); Tunneled venous port placement (Right, 10/10/2017); [facility]). . She  reports that she has been smoking. She has a 40.00 pack-year smoking history. She has never used smokeless tobacco. She reports that she does not drink alcohol or use drugs. .        Active Hospital Problems    Diagnosis Date Noted    Community acquired pneumonia [J18.9]     COPD, severity to be determined ([facility] [J44.9]     Nicotine abuse [Z72.0]     Respiratory distress [R06.03]     High fever [R50.9]     Septicemia ([facility] [A41.9]     Immunocompromised patient ([facility] [D84.9]     Acute respiratory failure with hypoxia ([facility] [J96.01]     Status post chemotherapy [Z92.21]     Pneumonia due to organism [J18.9] 2018    CLL (chronic lymphocytic leukemia) ([facility] [C91.90] 2017    HTN (hypertension) [I10] 08/15/2017    HLD (hyperlipidemia) [E78.5] 08/15/2017    GERD (gastroesophageal reflux disease) [K21.9] 08/15/2017    Chronic back pain [M54.9, G89.29] 08/15/2017       Current Facility-Administered Medications: piperacillin-tazobactam (ZOSYN) 3.375 g in dextrose 50 mL IVPB extended infusion (premix), 3.375 g, Intravenous, Q8H  [COMPLETED] Isavuconazonium Sulfate CAPS 372 mg, 372 mg, Oral, Q8H **FOLLOWED BY** Isavuconazonium Sulfate CAPS 372 mg, 372 mg, Oral, Daily  levofloxacin (LEVAQUIN) 750 MG/150ML infusion 750 mg, 750 mg, Intravenous, Q24H  lactobacillus (CULTURELLE) capsule 1 capsule, 1 capsule, Oral, BID WC  linezolid (ZYVOX) IVPB 600 mg, 600 mg, Intravenous, Q12H  FLUoxetine (PROZAC) capsule 10 mg, 10 mg, Oral, Daily  ibuprofen (ADVIL;MOTRIN) tablet 400 mg, 400 mg, Oral, Q6H PRN  oxyCODONE-acetaminophen (PERCOCET)  MG per tablet 1 tablet, 1 tablet, Oral, Q4H  ALPRAZolam (XANAX) tablet 2 mg, 2 mg, Oral, TID PRN  amLODIPine (NORVASC) tablet 10 mg, 10 mg, Oral, Daily  pravastatin (PRAVACHOL) tablet 20 mg, 20 mg, Oral, Daily  docusate sodium (COLACE) capsule 100 mg, 100 mg, Oral, Daily  ondansetron (ZOFRAN-ODT) disintegrating tablet 4 mg, 4 mg, Oral, Q8H PRN  potassium bicarb-citric acid (EFFER-K) effervescent tablet 20 mEq, 20 mEq, Oral, BID  sodium chloride flush 0.9 % injection 10 mL, 10 mL, Intravenous, 2 times per day  sodium chloride flush 0.9 % injection 10 mL, 10 mL, Intravenous, PRN  magnesium hydroxide (MILK OF MAGNESIA) 400 MG/5ML suspension 30 mL, 30 mL, Oral, Daily PRN  ondansetron (ZOFRAN) injection 4 mg, 4 mg, Intravenous, Q6H PRN  enoxaparin (LOVENOX) injection 40 mg, 40 mg, Subcutaneous, Nightly  potassium chloride (KLOR-CON M) extended release tablet 40 mEq, 40 mEq, Oral, PRN **OR** potassium bicarb-citric acid (EFFER-K) effervescent tablet 40 mEq, 40 mEq, Oral, PRN **OR** potassium chloride 10 mEq/100 mL IVPB (Peripheral Line), 10 mEq, Intravenous, PRN  pantoprazole (PROTONIX) tablet 40 mg, 40 mg, Oral, QAM Assessment: Established problem, now resolved. On RA now    Plan: Continue present orders/plan.              Negro Chan  11/26/2018

## 2018-11-27 LAB
ANION GAP SERPL CALCULATED.3IONS-SCNC: 8 MMOL/L (ref 3–16)
ASPERGILLUS ANTIBODY ID: NORMAL
BASOPHILS ABSOLUTE: 0 K/UL (ref 0–0.2)
BASOPHILS RELATIVE PERCENT: 0.3 %
BLASTOMYCES ANTIBODY ID: NORMAL
BUN BLDV-MCNC: 12 MG/DL (ref 7–20)
CALCIUM SERPL-MCNC: 8.2 MG/DL (ref 8.3–10.6)
CHLORIDE BLD-SCNC: 102 MMOL/L (ref 99–110)
CMV DNA QNT PCR: <2.6 LOG CPY/ML
CMV DNA QUANTATATIVE INTERPRETATION: <2.4 LOG IU/ML
CMV DNA QUANTATATIVE INTERPRETATION: NOT DETECTED
CMV DNA QUANTITATIVE: <227 IU/ML
CMV SOURCE: NORMAL
CMVQ COPY/ML: <390 CPY/ML
CO2: 34 MMOL/L (ref 21–32)
COCCIDIOIDES ANTIBODY ID: NORMAL
CREAT SERPL-MCNC: 0.9 MG/DL (ref 0.6–1.2)
EOSINOPHILS ABSOLUTE: 0.1 K/UL (ref 0–0.6)
EOSINOPHILS RELATIVE PERCENT: 2.5 %
GFR AFRICAN AMERICAN: >60
GFR NON-AFRICAN AMERICAN: >60
GLUCOSE BLD-MCNC: 92 MG/DL (ref 70–99)
HCT VFR BLD CALC: 26.9 % (ref 36–48)
HEMOGLOBIN: 9.2 G/DL (ref 12–16)
HISTOPLASMA ABS, ID: NORMAL
LYMPHOCYTES ABSOLUTE: 1.4 K/UL (ref 1–5.1)
LYMPHOCYTES RELATIVE PERCENT: 29.4 %
MCH RBC QN AUTO: 32.7 PG (ref 26–34)
MCHC RBC AUTO-ENTMCNC: 34.1 G/DL (ref 31–36)
MCV RBC AUTO: 95.8 FL (ref 80–100)
MONOCYTES ABSOLUTE: 0.5 K/UL (ref 0–1.3)
MONOCYTES RELATIVE PERCENT: 10.1 %
NEUTROPHILS ABSOLUTE: 2.7 K/UL (ref 1.7–7.7)
NEUTROPHILS RELATIVE PERCENT: 57.7 %
P JIROVECII BY PCR: NOT DETECTED
P JIROVECII SOURCE: NORMAL
PDW BLD-RTO: 13.7 % (ref 12.4–15.4)
PLATELET # BLD: 357 K/UL (ref 135–450)
PMV BLD AUTO: 7.8 FL (ref 5–10.5)
POTASSIUM SERPL-SCNC: 4 MMOL/L (ref 3.5–5.1)
RBC # BLD: 2.81 M/UL (ref 4–5.2)
SODIUM BLD-SCNC: 144 MMOL/L (ref 136–145)
VARICELLA-ZOSTER, PCR: NOT DETECTED
VZ SOURCE: NORMAL
WBC # BLD: 4.6 K/UL (ref 4–11)

## 2018-11-27 PROCEDURE — 1200000000 HC SEMI PRIVATE

## 2018-11-27 PROCEDURE — 99232 SBSQ HOSP IP/OBS MODERATE 35: CPT | Performed by: INTERNAL MEDICINE

## 2018-11-27 PROCEDURE — G8979 MOBILITY GOAL STATUS: HCPCS

## 2018-11-27 PROCEDURE — 97535 SELF CARE MNGMENT TRAINING: CPT

## 2018-11-27 PROCEDURE — 2580000003 HC RX 258: Performed by: INTERNAL MEDICINE

## 2018-11-27 PROCEDURE — 92526 ORAL FUNCTION THERAPY: CPT

## 2018-11-27 PROCEDURE — 97116 GAIT TRAINING THERAPY: CPT

## 2018-11-27 PROCEDURE — 6370000000 HC RX 637 (ALT 250 FOR IP): Performed by: INTERNAL MEDICINE

## 2018-11-27 PROCEDURE — 94668 MNPJ CHEST WALL SBSQ: CPT

## 2018-11-27 PROCEDURE — 94640 AIRWAY INHALATION TREATMENT: CPT

## 2018-11-27 PROCEDURE — 6360000002 HC RX W HCPCS: Performed by: INTERNAL MEDICINE

## 2018-11-27 PROCEDURE — G8980 MOBILITY D/C STATUS: HCPCS

## 2018-11-27 PROCEDURE — 94760 N-INVAS EAR/PLS OXIMETRY 1: CPT

## 2018-11-27 PROCEDURE — 36591 DRAW BLOOD OFF VENOUS DEVICE: CPT

## 2018-11-27 PROCEDURE — 85025 COMPLETE CBC W/AUTO DIFF WBC: CPT

## 2018-11-27 PROCEDURE — G8988 SELF CARE GOAL STATUS: HCPCS

## 2018-11-27 PROCEDURE — G8989 SELF CARE D/C STATUS: HCPCS

## 2018-11-27 PROCEDURE — 97530 THERAPEUTIC ACTIVITIES: CPT

## 2018-11-27 PROCEDURE — G8978 MOBILITY CURRENT STATUS: HCPCS

## 2018-11-27 PROCEDURE — G8987 SELF CARE CURRENT STATUS: HCPCS

## 2018-11-27 PROCEDURE — 80048 BASIC METABOLIC PNL TOTAL CA: CPT

## 2018-11-27 RX ORDER — LEVOFLOXACIN 500 MG/1
750 TABLET, FILM COATED ORAL DAILY
Status: DISCONTINUED | OUTPATIENT
Start: 2018-11-28 | End: 2018-11-28 | Stop reason: HOSPADM

## 2018-11-27 RX ADMIN — Medication 10 ML: at 08:38

## 2018-11-27 RX ADMIN — LEVOFLOXACIN 750 MG: 5 INJECTION, SOLUTION INTRAVENOUS at 08:37

## 2018-11-27 RX ADMIN — ISAVUCONAZONIUM SULFATE 372 MG: 186 CAPSULE ORAL at 09:22

## 2018-11-27 RX ADMIN — ENOXAPARIN SODIUM 40 MG: 40 INJECTION SUBCUTANEOUS at 20:35

## 2018-11-27 RX ADMIN — OXYCODONE HYDROCHLORIDE AND ACETAMINOPHEN 1 TABLET: 10; 325 TABLET ORAL at 13:03

## 2018-11-27 RX ADMIN — AMLODIPINE BESYLATE 10 MG: 5 TABLET ORAL at 08:36

## 2018-11-27 RX ADMIN — GABAPENTIN 600 MG: 300 CAPSULE ORAL at 20:35

## 2018-11-27 RX ADMIN — IPRATROPIUM BROMIDE AND ALBUTEROL SULFATE 1 AMPULE: .5; 3 SOLUTION RESPIRATORY (INHALATION) at 08:24

## 2018-11-27 RX ADMIN — OXYCODONE HYDROCHLORIDE AND ACETAMINOPHEN 1 TABLET: 10; 325 TABLET ORAL at 20:35

## 2018-11-27 RX ADMIN — ALPRAZOLAM 2 MG: 0.5 TABLET ORAL at 09:25

## 2018-11-27 RX ADMIN — Medication 1 CAPSULE: at 17:21

## 2018-11-27 RX ADMIN — PRAVASTATIN SODIUM 20 MG: 20 TABLET ORAL at 08:36

## 2018-11-27 RX ADMIN — GABAPENTIN 600 MG: 300 CAPSULE ORAL at 08:36

## 2018-11-27 RX ADMIN — FLUOXETINE HYDROCHLORIDE 10 MG: 10 CAPSULE ORAL at 08:36

## 2018-11-27 RX ADMIN — ALPRAZOLAM 2 MG: 0.5 TABLET ORAL at 17:46

## 2018-11-27 RX ADMIN — Medication 10 ML: at 20:36

## 2018-11-27 RX ADMIN — GABAPENTIN 600 MG: 300 CAPSULE ORAL at 13:03

## 2018-11-27 RX ADMIN — POTASSIUM BICARBONATE 20 MEQ: 782 TABLET, EFFERVESCENT ORAL at 08:36

## 2018-11-27 RX ADMIN — FUROSEMIDE 20 MG: 20 TABLET ORAL at 08:36

## 2018-11-27 RX ADMIN — PANTOPRAZOLE SODIUM 40 MG: 40 TABLET, DELAYED RELEASE ORAL at 05:05

## 2018-11-27 RX ADMIN — LOPERAMIDE HYDROCHLORIDE 2 MG: 2 CAPSULE ORAL at 13:05

## 2018-11-27 RX ADMIN — OXYCODONE HYDROCHLORIDE AND ACETAMINOPHEN 1 TABLET: 10; 325 TABLET ORAL at 04:47

## 2018-11-27 RX ADMIN — LOPERAMIDE HYDROCHLORIDE 2 MG: 2 CAPSULE ORAL at 05:05

## 2018-11-27 RX ADMIN — OXYCODONE HYDROCHLORIDE AND ACETAMINOPHEN 1 TABLET: 10; 325 TABLET ORAL at 08:35

## 2018-11-27 RX ADMIN — POTASSIUM BICARBONATE 20 MEQ: 782 TABLET, EFFERVESCENT ORAL at 20:35

## 2018-11-27 RX ADMIN — OXYCODONE HYDROCHLORIDE AND ACETAMINOPHEN 1 TABLET: 10; 325 TABLET ORAL at 17:21

## 2018-11-27 RX ADMIN — Medication 1 CAPSULE: at 08:45

## 2018-11-27 ASSESSMENT — PAIN SCALES - GENERAL
PAINLEVEL_OUTOF10: 7
PAINLEVEL_OUTOF10: 0
PAINLEVEL_OUTOF10: 1
PAINLEVEL_OUTOF10: 0
PAINLEVEL_OUTOF10: 8
PAINLEVEL_OUTOF10: 2
PAINLEVEL_OUTOF10: 8
PAINLEVEL_OUTOF10: 8
PAINLEVEL_OUTOF10: 0
PAINLEVEL_OUTOF10: 7
PAINLEVEL_OUTOF10: 0
PAINLEVEL_OUTOF10: 6
PAINLEVEL_OUTOF10: 1

## 2018-11-27 ASSESSMENT — ENCOUNTER SYMPTOMS
CHEST TIGHTNESS: 0
STRIDOR: 0
TROUBLE SWALLOWING: 0
RHINORRHEA: 0
COLOR CHANGE: 0
PHOTOPHOBIA: 0
CHOKING: 0
APNEA: 0
SHORTNESS OF BREATH: 0
EYE DISCHARGE: 0
EYE REDNESS: 0
COUGH: 0
ABDOMINAL PAIN: 0
DIARRHEA: 0
BLOOD IN STOOL: 0
NAUSEA: 0
FACIAL SWELLING: 0

## 2018-11-27 ASSESSMENT — PAIN DESCRIPTION - LOCATION
LOCATION: ABDOMEN;GENERALIZED
LOCATION: ABDOMEN

## 2018-11-27 ASSESSMENT — PAIN DESCRIPTION - DESCRIPTORS: DESCRIPTORS: DISCOMFORT;CRAMPING;DULL

## 2018-11-27 ASSESSMENT — PAIN DESCRIPTION - PROGRESSION: CLINICAL_PROGRESSION: NOT CHANGED

## 2018-11-27 ASSESSMENT — PAIN DESCRIPTION - PAIN TYPE
TYPE: CHRONIC PAIN
TYPE: CHRONIC PAIN

## 2018-11-27 ASSESSMENT — PAIN DESCRIPTION - ORIENTATION: ORIENTATION: MID;LOWER

## 2018-11-27 ASSESSMENT — PAIN DESCRIPTION - FREQUENCY: FREQUENCY: CONTINUOUS

## 2018-11-27 ASSESSMENT — PAIN DESCRIPTION - ONSET: ONSET: ON-GOING

## 2018-11-27 NOTE — PROGRESS NOTES
Physical Therapy  Facility/Department: [facility] ICU  Daily Treatment Note/Discharge Summary  NAME: Isabelle Tracy  : 1955  MRN: [id number]    Date of Service: 2018    Discharge Ran Castillo scored a 23/24 on the AM-PAC short mobility form. At this time, no further PT is recommended upon discharge due to independence with functional mobility and continued independent mobility at home. Recommend patient returns to prior setting with prior services. PT Equipment Recommendations  Equipment Needed: No    Patient Diagnosis(es): The primary encounter diagnosis was Pneumonia due to organism. Diagnoses of Bronchitis and Septicemia ([facility] were also pertinent to this visit. has a past medical history of Allergic; Anxiety; Back pain; Cancer ([facility] Depression; Glaucoma; Hyperlipidemia; and Hypertension. has a past surgical history that includes  section; Hysterectomy; Knee arthroscopy; Appendectomy; Colonoscopy; Upper gastrointestinal endoscopy (2017); ERCP (N/A, 09/15/2017); Tunneled venous port placement (Right, 10/10/2017); Endoscopy, colon, diagnostic; and pr [facility] w/brncl alveolar lavage (N/A, 2018). Restrictions  Restrictions/Precautions  Restrictions/Precautions: Fall Risk (Medium)  Required Braces or Orthoses?: No  Position Activity Restriction  Other position/activity restrictions: 66 y.o. female who presents here to the emergency department, the patient states that she has a history of CLL, and has chronic abdominal pain however she's been sick for the past 2 weeks with fevers and chills, and cough. She sees Dr. Duke Hogan. She denies any actual central chest pain, she does admit to fevers T-max of 101.5. Nothing seems to make her feel completely better, she does state that she is tired of the coughing. Rapid response called  and patient transferred to ICU. Bronch done on .   Subjective   General  Chart Reviewed: Yes  Response To Previous Treatment: Patient with no complaints from previous session. Family / Caregiver Present: No  Subjective  Subjective: Pt agreeable to PT treatment, reporting 7/10 pain in abdomen, but also reports having had pain medication ~20 minutes prior to PT arrival.  General Comment  Comments: Pt sitting up in bedside mg upon arrival, feet elevated. Pt able to lower feet of recliner independently. Pain Screening  Patient Currently in Pain: Yes  Pain Assessment  Pain Assessment: 0-10  Pain Level: 7  Pain Type: Chronic pain  Pain Location: Abdomen  Pain Intervention(s): Declines; Ambulation/Increased activity  Vital Signs  Patient Currently in Pain: Yes       Orientation  Orientation  Overall Orientation Status: Within Functional Limits  Cognition      Objective   Bed mobility  Supine to Sit: Unable to assess  Comment: Pt sitting up in chair upon arrival and returned to chair following ambulation; pt medium falls risk and reports ambulatory in room independent, corraborated by RN, Rachel Joyce. Transfers  Sit to Stand: Modified independent  Stand to sit: Modified independent  Ambulation  Ambulation?: Yes  Ambulation 1  Surface: level tile  Device: No Device  Assistance: Modified Independent  Quality of Gait: narrow Josue, slightly decreased misael, slight sway, no LOB  Distance: ~200 ft  Comments: Pt reports mild weakness compared to baseline, but reports that she ambulates or \"flits\" around at home and anticipates continued mobility. Stairs/Curb  Stairs?: Yes  Stairs  # Steps : 3  Stairs Height: 6\"  Rails: Left ascending  Device: No Device  Assistance: Supervision  Comment: No LOB, slow, reciprocal gait. Balance  Posture: Good  Sitting - Static: Good  Sitting - Dynamic: Good  Standing - Static: Good  Standing - Dynamic: Good;-  Exercises  Hip Flexion: x10 BLE  Knee Long Arc Quad: x10 BLE                        Assessment   Body structures, Functions, Activity limitations: Decreased functional mobility ; Decreased

## 2018-11-27 NOTE — PROGRESS NOTES
and two-view chest 11/18/2018 HISTORY: ORDERING SYSTEM PROVIDED HISTORY: pneumonia TECHNOLOGIST PROVIDED HISTORY: Reason for exam:-> pneumonia Ordering Physician Provided Reason for Exam: pneumonia Acuity: Unknown Type of Exam: Unknown FINDINGS: Cardiac silhouette appears within normal limits for size. Mediastinum appears unremarkable. The hilar silhouettes appear unremarkable. Patchy alveolar infiltrates in the right middle lobe have decreased but persists, infiltrates in the left parahilar lung have increased. No pneumothorax is seen. Visualized osseous structures appear unremarkable. Right IJ Port-A-Cath tip overlies the proximal superior vena cava level. Infiltrates right middle lobe have decreased, infiltrates left parahilar lung have increased. When correlating with prior CT chest, the findings are suspicious for areas shifting atelectasis with pneumonia. Xr Chest Standard (2 Vw)    Result Date: 11/18/2018  EXAMINATION: TWO VIEWS OF THE CHEST 11/18/2018 10:30 am COMPARISON: 07/10/2018 HISTORY: ORDERING SYSTEM PROVIDED HISTORY: Chest Discomfort TECHNOLOGIST PROVIDED HISTORY: Reason for exam:->Chest Discomfort Ordering Physician Provided Reason for Exam: Cough (Patient reporting cough, congestion and abdominal pain for the past 2 weeks. Also reporting a fever. Reports she has a history of CLL. ) Acuity: Unknown Type of Exam: Unknown FINDINGS: Port is seen in the right chest wall. New heterogeneous right basilar pulmonary opacity has developed. No significant pleural effusion. No evidence of pneumothorax. Cardiac and mediastinal silhouettes are unchanged. Developing right basilar consolidation, concerning for pneumonia. Radiographic follow-up to resolution recommended.      Ct Chest W Contrast    Result Date: 11/21/2018  EXAMINATION: CT OF THE CHEST WITH CONTRAST 11/21/2018 9:59 am TECHNIQUE: CT of the chest was performed with the administration of intravenous contrast. Multiplanar reformatted

## 2018-11-27 NOTE — PROGRESS NOTES
Speech Language Pathology  Dysphagia Treatment Note/Discharge Note    Name:  Patricia Chance  :   1955  Medical Diagnosis:  PNA (pneumonia) [J18.9]  PNA (pneumonia) [J18.9]  Treatment Diagnosis: Oropharyngeal Dysphagia  Pain level: Pt denies pain at this time    Current Diet Level: regular texture diet/thin liquids; encourage pt to order softer options ; No straws;meds as tolerated    Tolerance of Current Diet Level: Pt demonstrates improved respiratory status and is currently maintaining O2 sats on RA. Assessment of Texture Tolerance:  -Impressions: pt demonstrates improved bolus control, and A-P propulsion with all textures. Clinical symptoms of mild delayed swallow initiation but improved laryngeal excursion during the swallow noted with all textures. No overt signs of aspiration noted with any texture. Education regarding impact of respiratory compromise on airway protection and potential aspiration risk explained to the Pt who verbalized understanding. No further dysphagia treatment intervention is indicated at this time    Discharge Recommendations:  Discharge  Speech Therapy/Dysphagia  Pt discharged from Speech/Dysphagia Treatment on 18 . Bedside Swallow Eval completed 18 (see report). Goals met prior to discharge. No further dysphagia treatment intervention recommended at this time    GOALS ADDRESSED:  ST.) Pt will tolerate recommended diet without s/s of aspiration (Goal met 18)  2.) If clinical symptoms of penetration/aspiration continue to be noted,Pt will tolerate MBS to r/o aspiration and determine appropriate diet/liquid level. (Goal not indicated;  Discontinue goal 18)    Timed Code Treatment: 0 min    Total Treatment Time: 15 min    Lacy OSMAN-GBO#[id number]

## 2018-11-27 NOTE — PROGRESS NOTES
Shift assessment complete, see flowsheets. A/O x4. VSS and afebrile. Pt remains on RA at this time, O2sats remains stable. Fine crackles noted to bilateral lung bases. Pt denies SOB/dyspnea at this time. Pt continues with loose BMs at this time, imodium given per [facility]. O2sats remained stable with ambulation. Evening medications given per MAR. Patient is resting in bed at this time. Reviewed POC, all questions answered. Possible d/c home in AM. Denies additional needs. Call light is in reach, encouraged to call for assistance when needed. Pt is a medium fall risk. Will continue to monitor.  Jamia Samuel RN

## 2018-11-27 NOTE — PROGRESS NOTES
InfectiousDiseases   Progress Note      Admission Date: 11/18/2018  Hospital Day: Hospital Day: 10  Attending: Reggie Pete MD  [address] service: 11/27/2018    Chief complaint/ Reason for consult: The patient was seen today for the following:    · Sepsis with high-grade fever  · Acute respiratory failure with hypoxia  · Severe multifocal pneumonia  · Immunocompromised patient with CLL  · Status post chemotherapy with 6 cycles of Obintuzumab/Chlorambucil in October 2017    Subjective: Interval history: Patient was seen and examined at bedside. Interval history was obtained. She is on Levaquin and Crsemba. She is tolerating the medications okay. REVIEW OF SYSTEMS:      Review of Systems   Constitutional: Positive for fatigue. Negative for chills, diaphoresis and unexpected weight change. HENT: Negative for congestion, ear discharge, ear pain, facial swelling, hearing loss, rhinorrhea and trouble swallowing. Eyes: Negative for photophobia, discharge, redness and visual disturbance. Respiratory: Negative for apnea, cough, choking, chest tightness, shortness of breath and stridor. Cardiovascular: Negative for chest pain and palpitations. Gastrointestinal: Negative for abdominal pain, blood in stool, diarrhea and nausea. Endocrine: Negative for polydipsia, polyphagia and polyuria. Genitourinary: Negative for difficulty urinating, dysuria, frequency, hematuria, menstrual problem and vaginal discharge. Musculoskeletal: Negative for arthralgias, joint swelling, myalgias and neck stiffness. Skin: Negative for color change and rash. Allergic/Immunologic: Negative for immunocompromised state. Neurological: Negative for dizziness, seizures, speech difficulty, light-headedness and headaches. Hematological: Negative for adenopathy. Psychiatric/Behavioral: Negative for agitation, hallucinations and suicidal ideas.          Past Medical History: All past medical history reviewed Toradol [Ketorolac Tromethamine] Anaphylaxis and Nausea Only     \"doesn't help anyway. \"     Acetaminophen     Haldol [Haloperidol Lactate]     Morphine      \"just doesn't work\"    Other     Prochlorperazine Maleate     Sulfamethoxazole-Trimethoprim Itching    Sumatriptan Other (See Comments)     From 2/25/2008 home med. Reconciliation record. No reaction noted. This entry to replace \"other-some other migraine meds\"    Trazodone Other (See Comments)     Nightmares    Vistaril [Hydroxyzine Hcl]     Biaxin [Clarithromycin] Nausea And Vomiting    Butorphanol Rash    Tramadol Rash       Microbiology: I have reviewed all available micro lab data andcultures    · Blood culture (2/2) - collected on 11/18/2018 : Negative so far  ·   BAL culture: Collected on 11/23/18. In process    11/26/2018  8:12 AM - Shearon Ranks Incoming Lab Results From Soft (Epic Adt)     Component Results     Component Collected Lab   Mycoplasma pneumo by PCR  (Abnormal) 11/23/2018 11:30 AM [facility]   Detected     Comment:   INTERPRETIVE INFORMATION: Mycoplasma pneumoniae by PCR   Test developed and characteristics determined by [facility]. See   Compliance Statement B: [employer]Closely/CS   Performed by [facility] [phone]   www. Andrea Luciano MD - Lab.  Director    Mycoplasma Pneumo Source 11/23/2018 11:30 AM [facility]   BAL RML    Comment:   BAL RML   Testing Performed By     Lab - Abbreviation Name Director Address Valid Date Range   [address]  Edgardo Serrano MD [address] [address] 08/31/17 1333-Present         Problems addressed today:       Patient Active Problem List   Diagnosis Code    DVT (deep venous thrombosis) ([facility] I82.409    Tobacco abuse Z72.0    Intra-abdominal lymphadenopathy R59.0    Hypokalemia E87.6    Anxiety and depression F41.9, F32.9    GERD (gastroesophageal reflux disease) K21.9    HTN (hypertension) I10    HLD (hyperlipidemia) E78.5   

## 2018-11-27 NOTE — PROGRESS NOTES
without assistance. pt declining skilled OT services upon discharge. Pt educated on safety with ADLs/IADLs at d/c. Pt to consult MD for home health it pt feels she needs it  Treatment Diagnosis: PNA  Prognosis: Good  Patient Education: OT treatment, POC, discharge, stress management, UE strengthening ax  REQUIRES OT FOLLOW UP: No  Activity Tolerance  Activity Tolerance: Patient Tolerated treatment well  Activity Tolerance: no SOB with activity  Safety Devices  Safety Devices in place: Yes  Type of devices: All fall risk precautions in place; Left in chair;Call light within reach;Nurse notified; Patient at risk for falls (med fall risk, no chair alarm, RN notified)          Plan   Plan  Times per week: pt returned to baseline level of functioning, not OT goals to be addressed at this time  Times per day: Daily  Current Treatment Recommendations: Strengthening, Balance Training, Functional Mobility Training, Endurance Training, Self-Care / ADL, Home Management Training, Patient/Caregiver Education & Training, Equipment Evaluation, Education, & procurement  G-Code  OT G-codes  Functional Assessment Tool Used: AM PAC ADL  Score: 23  Self Care Current Status (): At least 1 percent but less than 20 percent impaired, limited or restricted  Self Care Goal Status (): At least 1 percent but less than 20 percent impaired, limited or restricted  Self Care Discharge Status (): At least 1 percent but less than 20 percent impaired, limited or restricted  AM-PAC Score        AM-[facility] Inpatient Daily Activity Raw Score: 23  AM-PAC Inpatient ADL T-Scale Score : 51.12  ADL Inpatient CMS 0-100% Score: 15.86  ADL Inpatient CMS G-Code Modifier : CI    Goals  Short term goals  Time Frame for Short term goals: Discharge  Short term goal 1: Patient will bathe supervision-goal not addressed pt refused, \"I have been cleaned up this morning. \" SBA per chart from previous notes.  Per therapists clinical judgment and pt report, Pt would

## 2018-11-28 VITALS
TEMPERATURE: 97.8 F | HEART RATE: 81 BPM | OXYGEN SATURATION: 91 % | DIASTOLIC BLOOD PRESSURE: 60 MMHG | RESPIRATION RATE: 14 BRPM | BODY MASS INDEX: 32.62 KG/M2 | HEIGHT: 62 IN | WEIGHT: 177.25 LBS | SYSTOLIC BLOOD PRESSURE: 103 MMHG

## 2018-11-28 LAB
ANION GAP SERPL CALCULATED.3IONS-SCNC: 9 MMOL/L (ref 3–16)
BASOPHILS ABSOLUTE: 0 K/UL (ref 0–0.2)
BASOPHILS RELATIVE PERCENT: 0.4 %
BUN BLDV-MCNC: 11 MG/DL (ref 7–20)
CALCIUM SERPL-MCNC: 9.2 MG/DL (ref 8.3–10.6)
CHLORIDE BLD-SCNC: 100 MMOL/L (ref 99–110)
CO2: 32 MMOL/L (ref 21–32)
CREAT SERPL-MCNC: 0.9 MG/DL (ref 0.6–1.2)
DAT POLYSPECIFIC: NORMAL
EOSINOPHILS ABSOLUTE: 0.1 K/UL (ref 0–0.6)
EOSINOPHILS RELATIVE PERCENT: 3.5 %
FERRITIN: 343.3 NG/ML (ref 15–150)
FOLATE: 5.4 NG/ML (ref 4.78–24.2)
GFR AFRICAN AMERICAN: >60
GFR NON-AFRICAN AMERICAN: >60
GLUCOSE BLD-MCNC: 103 MG/DL (ref 70–99)
HAPTOGLOBIN: 384 MG/DL (ref 30–200)
HCT VFR BLD CALC: 29.7 % (ref 36–48)
HEMOGLOBIN: 9.9 G/DL (ref 12–16)
IRON SATURATION: 35 % (ref 15–50)
IRON: 89 UG/DL (ref 37–145)
LEGIONELLA PNEUMOPHILIA BY PCR: NOT DETECTED
LEGIONELLA SOURCE: NORMAL
LEGIONELLA SPECIES PCR: NOT DETECTED
LYMPHOCYTES ABSOLUTE: 1.4 K/UL (ref 1–5.1)
LYMPHOCYTES RELATIVE PERCENT: 37.4 %
MCH RBC QN AUTO: 32.4 PG (ref 26–34)
MCHC RBC AUTO-ENTMCNC: 33.4 G/DL (ref 31–36)
MCV RBC AUTO: 97.2 FL (ref 80–100)
MONOCYTES ABSOLUTE: 0.5 K/UL (ref 0–1.3)
MONOCYTES RELATIVE PERCENT: 13.1 %
NEUTROPHILS ABSOLUTE: 1.7 K/UL (ref 1.7–7.7)
NEUTROPHILS RELATIVE PERCENT: 45.6 %
PDW BLD-RTO: 13.7 % (ref 12.4–15.4)
PLATELET # BLD: 378 K/UL (ref 135–450)
PMV BLD AUTO: 7.6 FL (ref 5–10.5)
POTASSIUM SERPL-SCNC: 4.2 MMOL/L (ref 3.5–5.1)
RBC # BLD: 3.05 M/UL (ref 4–5.2)
SODIUM BLD-SCNC: 141 MMOL/L (ref 136–145)
TOTAL IRON BINDING CAPACITY: 257 UG/DL (ref 260–445)
VITAMIN B-12: 415 PG/ML (ref 211–911)
WBC # BLD: 3.7 K/UL (ref 4–11)

## 2018-11-28 PROCEDURE — 82728 ASSAY OF FERRITIN: CPT

## 2018-11-28 PROCEDURE — 80048 BASIC METABOLIC PNL TOTAL CA: CPT

## 2018-11-28 PROCEDURE — 83540 ASSAY OF IRON: CPT

## 2018-11-28 PROCEDURE — 85025 COMPLETE CBC W/AUTO DIFF WBC: CPT

## 2018-11-28 PROCEDURE — 6360000002 HC RX W HCPCS: Performed by: INTERNAL MEDICINE

## 2018-11-28 PROCEDURE — 83010 ASSAY OF HAPTOGLOBIN QUANT: CPT

## 2018-11-28 PROCEDURE — 82746 ASSAY OF FOLIC ACID SERUM: CPT

## 2018-11-28 PROCEDURE — 6370000000 HC RX 637 (ALT 250 FOR IP): Performed by: INTERNAL MEDICINE

## 2018-11-28 PROCEDURE — 83550 IRON BINDING TEST: CPT

## 2018-11-28 PROCEDURE — 86880 COOMBS TEST DIRECT: CPT

## 2018-11-28 PROCEDURE — 2580000003 HC RX 258: Performed by: INTERNAL MEDICINE

## 2018-11-28 PROCEDURE — 99232 SBSQ HOSP IP/OBS MODERATE 35: CPT | Performed by: INTERNAL MEDICINE

## 2018-11-28 PROCEDURE — 82607 VITAMIN B-12: CPT

## 2018-11-28 RX ORDER — CLOTRIMAZOLE AND BETAMETHASONE DIPROPIONATE 10; .64 MG/G; MG/G
CREAM TOPICAL 2 TIMES DAILY
Status: DISCONTINUED | OUTPATIENT
Start: 2018-11-28 | End: 2018-11-28 | Stop reason: HOSPADM

## 2018-11-28 RX ORDER — CLOTRIMAZOLE AND BETAMETHASONE DIPROPIONATE 10; .64 MG/G; MG/G
CREAM TOPICAL
Qty: 30 G | Refills: 0 | Status: SHIPPED | OUTPATIENT
Start: 2018-11-28

## 2018-11-28 RX ADMIN — POTASSIUM BICARBONATE 20 MEQ: 782 TABLET, EFFERVESCENT ORAL at 08:50

## 2018-11-28 RX ADMIN — GABAPENTIN 600 MG: 300 CAPSULE ORAL at 08:49

## 2018-11-28 RX ADMIN — LEVOFLOXACIN 750 MG: 500 TABLET, FILM COATED ORAL at 08:49

## 2018-11-28 RX ADMIN — OXYCODONE HYDROCHLORIDE AND ACETAMINOPHEN 1 TABLET: 10; 325 TABLET ORAL at 05:11

## 2018-11-28 RX ADMIN — Medication 10 ML: at 08:50

## 2018-11-28 RX ADMIN — PRAVASTATIN SODIUM 20 MG: 20 TABLET ORAL at 08:49

## 2018-11-28 RX ADMIN — Medication 1 CAPSULE: at 08:17

## 2018-11-28 RX ADMIN — ALPRAZOLAM 2 MG: 0.5 TABLET ORAL at 09:43

## 2018-11-28 RX ADMIN — GABAPENTIN 600 MG: 300 CAPSULE ORAL at 15:36

## 2018-11-28 RX ADMIN — ONDANSETRON 4 MG: 2 INJECTION INTRAMUSCULAR; INTRAVENOUS at 04:08

## 2018-11-28 RX ADMIN — LOPERAMIDE HYDROCHLORIDE 2 MG: 2 CAPSULE ORAL at 08:11

## 2018-11-28 RX ADMIN — FUROSEMIDE 20 MG: 20 TABLET ORAL at 08:50

## 2018-11-28 RX ADMIN — OXYCODONE HYDROCHLORIDE AND ACETAMINOPHEN 1 TABLET: 10; 325 TABLET ORAL at 13:02

## 2018-11-28 RX ADMIN — OXYCODONE HYDROCHLORIDE AND ACETAMINOPHEN 1 TABLET: 10; 325 TABLET ORAL at 00:41

## 2018-11-28 RX ADMIN — FLUOXETINE HYDROCHLORIDE 10 MG: 10 CAPSULE ORAL at 08:50

## 2018-11-28 RX ADMIN — OXYCODONE HYDROCHLORIDE AND ACETAMINOPHEN 1 TABLET: 10; 325 TABLET ORAL at 08:49

## 2018-11-28 RX ADMIN — AMLODIPINE BESYLATE 10 MG: 5 TABLET ORAL at 08:49

## 2018-11-28 RX ADMIN — CLOTRIMAZOLE AND BETAMETHASONE DIPROPIONATE: 10; .5 CREAM TOPICAL at 08:52

## 2018-11-28 RX ADMIN — PANTOPRAZOLE SODIUM 40 MG: 40 TABLET, DELAYED RELEASE ORAL at 05:11

## 2018-11-28 ASSESSMENT — PAIN SCALES - GENERAL
PAINLEVEL_OUTOF10: 8
PAINLEVEL_OUTOF10: 8
PAINLEVEL_OUTOF10: 7
PAINLEVEL_OUTOF10: 6
PAINLEVEL_OUTOF10: 0
PAINLEVEL_OUTOF10: 3
PAINLEVEL_OUTOF10: 8

## 2018-11-28 ASSESSMENT — PAIN DESCRIPTION - PROGRESSION
CLINICAL_PROGRESSION: GRADUALLY IMPROVING
CLINICAL_PROGRESSION: GRADUALLY WORSENING
CLINICAL_PROGRESSION: NOT CHANGED
CLINICAL_PROGRESSION: GRADUALLY IMPROVING
CLINICAL_PROGRESSION: GRADUALLY WORSENING

## 2018-11-28 ASSESSMENT — ENCOUNTER SYMPTOMS
COLOR CHANGE: 0
NAUSEA: 0
DIARRHEA: 0
TROUBLE SWALLOWING: 0
ABDOMINAL PAIN: 0
CHOKING: 0
FACIAL SWELLING: 0
PHOTOPHOBIA: 0
SHORTNESS OF BREATH: 0
EYE DISCHARGE: 0
COUGH: 0
BLOOD IN STOOL: 0
CHEST TIGHTNESS: 0
STRIDOR: 0
EYE REDNESS: 0
APNEA: 0
RHINORRHEA: 0

## 2018-11-28 ASSESSMENT — PAIN DESCRIPTION - ORIENTATION: ORIENTATION: RIGHT;LEFT;LOWER;UPPER;MID

## 2018-11-28 ASSESSMENT — PAIN DESCRIPTION - DESCRIPTORS: DESCRIPTORS: ACHING;PRESSURE

## 2018-11-28 ASSESSMENT — PAIN DESCRIPTION - PAIN TYPE: TYPE: CHRONIC PAIN

## 2018-11-28 ASSESSMENT — PAIN DESCRIPTION - FREQUENCY: FREQUENCY: CONTINUOUS

## 2018-11-28 ASSESSMENT — PAIN DESCRIPTION - LOCATION: LOCATION: ABDOMEN

## 2018-11-28 NOTE — ONCOLOGY
Oncology and Hematology Care   Progress Note    11/27/2018    SUBJECTIVE:  Patient is clinically doing better. She is breathing comfortably. Does not think she needs oxygen. OBJECTIVE:    Physical Assessment:  Vitals:  /60   Pulse 80   Temp 97.7 °F (36.5 °C) (Temporal)   Resp 18   Ht 5' 2\" (1.575 m)   Wt 185 lb 3 oz (84 kg)   SpO2 91%   BMI 33.87 kg/m²    24HR INTAKE/OUTPUT:  No intake or output data in the 24 hours ending 11/27/18 2055    CONSTITUTIONAL:  Awake, alert & oriented x3  HEENT: PERRL, Neck: soft, supple, no cervical, supraclavicular or axillary adenopathy  RESPIRATORY:  No increased work of breathing, breath sounds decreased. CARDIOVASCULAR:  Cardiac S1/S2, RRR  GASTROINTESTINAL:  abdomen soft +BS x4, no hepatosplenomegaly  SKIN:  negative for rash and skin lesion(s)  MUSCULOSKELETAL:  negative for pain and muscle weakness  EXTREMITIES: Negative for Lower extremity edema    Labs Results:    CBC:   Recent Labs      11/25/18 0432 11/26/18 0449 11/27/18 0449   WBC  4.5  4.2  4.6   HGB  9.2*  9.3*  9.2*   HCT  27.8*  28.2*  26.9*   MCV  96.4  96.6  95.8   PLT  344  336  357     BMP:   Recent Labs      11/25/18 0432 11/26/18 0449 11/27/18 0449   NA  144  143  144   K  3.8  3.6  4.0   CL  105  102  102   CO2  30  33*  34*   BUN  13  12  12   CREATININE  0.8  0.9  0.9     LIVER PROFILE: No results for input(s): AST, ALT, LIPASE, BILIDIR, BILITOT, ALKPHOS in the last 72 hours. Invalid input(s):   AMYLASE,  ALB  PT/INR:    Lab Results   Component Value Date    PROTIME 17.4 11/22/2018    PROTIME 12.4 10/10/2017    PROTIME 11.8 09/05/2017    INR 1.53 11/22/2018    INR 1.10 10/10/2017    INR 1.04 09/05/2017     PTT:    Lab Results   Component Value Date    APTT 30.2 11/22/2018    APTT 29.9 10/10/2017    APTT 31.5 09/05/2017     UA:No results for input(s): NITRITE, COLORU, PHUR, LABCAST, WBCUA, RBCUA, MUCUS, TRICHOMONAS, YEAST, BACTERIA, CLARITYU, SPECGRAV, LEUKOCYTESUR, No acute findings of the thoracic aorta. 2. Bilateral pulmonary opacities, greater in the upper lobes, suspicious for edema or pneumonia. Ct Abdomen Pelvis W Iv Contrast Additional Contrast? None    Result Date: 11/18/2018  EXAMINATION: CT OF THE CHEST WITH CONTRAST; CT OF THE ABDOMEN AND PELVIS WITH CONTRAST 11/18/2018 12:29 pm; 11/18/2018 12:30 pm TECHNIQUE: CT of the chest was performed with the administration of intravenous contrast. Multiplanar reformatted images are provided for review. Dose modulation, iterative reconstruction, and/or weight based adjustment of the mA/kV was utilized to reduce the radiation dose to as low as reasonably achievable.; CT of the abdomen and pelvis was performed with the administration of intravenous contrast. Multiplanar reformatted images are provided for review. Dose modulation, iterative reconstruction, and/or weight based adjustment of the mA/kV was utilized to reduce the radiation dose to as low as reasonably achievable. COMPARISON: CT scan of the chest, abdomen, and pelvis July 10, 2018. HISTORY: ORDERING SYSTEM PROVIDED HISTORY: CLL evaluation per dr Sandra Garduno TECHNOLOGIST PROVIDED HISTORY: Ordering Physician Provided Reason for Exam: cough Acuity: Unknown Type of Exam: Unknown; ORDERING SYSTEM PROVIDED HISTORY: CLL evaluation per Dr Sandra Garduno TECHNOLOGIST PROVIDED HISTORY: If patient is on cardiac monitor and/or pulse ox, they may be taken off cardiac monitor and pulse ox, left on O2 if currently on. All monitors reattached when patient returns to room. Additional Contrast?->None Ordering Physician Provided Reason for Exam: cough Acuity: Unknown Type of Exam: Unknown FINDINGS: Chest: Mediastinum: Calcified subcarinal and left hilar lymph nodes are again seen. There are mildly prominent right paratracheal, AP window, subcarinal, and bilateral hilar noncalcified lymph nodes present which are either unchanged or slightly more prominent compared to July 2018.

## 2018-11-28 NOTE — PROGRESS NOTES
Discharge instructions discussed with pt, all questions answered. Left port de-accessed. To outside per w/c.  All belongings sent with pt

## 2018-11-28 NOTE — DISCHARGE INSTR - COC
Continuity of Care Form    Patient Name: Soila Siddiqi   :  1955  MRN:  [id number]    Admit date:  2018  Discharge date:  2018  Code Status Order: Full Code   Advance Directives:   Advance Care Flowsheet Documentation     Date/Time Healthcare Directive Type of Healthcare Directive Copy in [address] Agent's Name Healthcare Agent's Phone Number    18 1643  No, patient does not have an advance directive for healthcare treatment -- -- -- -- --          Admitting Physician:  Roberto Gunn MD  PCP: Navneet Mendiola MD    Discharging Nurse:[facility] Unit/Room#: [id number]/[id number]  Discharging Unit Phone Number:[phone]    Emergency Contact:   Extended Emergency Contact Information  Primary Emergency Contact: [address] [facility] [address] Phone: [phone]  Relation: Spouse  Secondary Emergency Contact: Haley Dorsey [address] Phone: [phone]  Relation: Child    Past Surgical History:  Past Surgical History:   Procedure Laterality Date    APPENDECTOMY       SECTION      COLONOSCOPY      ENDOSCOPY, COLON, DIAGNOSTIC      ERCP N/A 09/15/2017    HYSTERECTOMY      KNEE ARTHROSCOPY      right    MS [address] W/BRNCL ALVEOLAR LAVAGE N/A 2018    BRONCHOSCOPY ALVEOLAR LAVAGE performed by Meron Marc MD at [address] [facility] TUNNELED VENOUS PORT PLACEMENT Right 10/10/2017    Power Port insertion with Evens Mays MD in [facility] [place] [address]  2017       Immunization History: There is no immunization history for the selected administration types on file for this patient.     Active Problems:  Patient Active Problem List   Diagnosis Code    DVT [facility]deep venous thrombosis) ([facility]) I82.409    Tobacco abuse Z72.0    Intra-abdominal lymphadenopathy R59.0    Hypokalemia E87.6    Anxiety and depression F41.9, F32.9    GERD (gastroesophageal

## 2018-11-28 NOTE — PROGRESS NOTES
Progress Note - Dr. Tho Medina - Internal Medicine  PCP: Qamar Cardoso MD None None    Hospital Day: 10  Code Status: Full Code  Current Diet: DIET GENERAL; No Drinking Straw        CC: follow up on medical issues    Subjective:   Isabelle Tracy is a 66 y.o. female. She denies problems     doing better  Wants to go home      She denies chest pain, denies shortness of breath, denies nausea,  denies emesis. 10 system Review of Systems is reviewed with patient, and pertinent positives are listed here: None . Otherwise, Review of systems is negative. I have reviewed the patient's medical and social history in detail and updated the computerized patient record. To recap: She  has a past medical history of Allergic; Anxiety; Back pain; Cancer ([address] Depression; Glaucoma; Hyperlipidemia; and Hypertension. . She  has a past surgical history that includes  section; Hysterectomy; Knee arthroscopy; Appendectomy; Colonoscopy; Upper gastrointestinal endoscopy (2017); ERCP (N/A, 09/15/2017); Tunneled venous port placement (Right, 10/10/2017); [facility], diagnostic; [facility] (N/A, 2018). . She  reports that she has been smoking. She has a 40.00 pack-year smoking history. She has never used smokeless tobacco. She reports that she does not drink alcohol or use drugs. .        Active Hospital Problems    Diagnosis Date Noted    Pneumonia due to Mycoplasma pneumoniae [J15.7]     Community acquired pneumonia [J18.9]     COPD, severity to be determined ([address] [J44.9]     Nicotine abuse [Z72.0]     Respiratory distress [R06.03]     High fever [R50.9]     Septicemia ([address] [A41.9]     Immunocompromised patient ([address] [D84.9]     Acute respiratory failure with hypoxia ([address] [J96.01]     Status post chemotherapy [Z92.21]     Pneumonia due to organism [J18.9] 2018    CLL (chronic lymphocytic leukemia) ([address] [C91.90] 2017    HTN (hypertension) [I10] prominent compared to July 2018. Central pulmonary arteries are unremarkable. No acute finding within the thoracic aorta. Coronary artery calcifications are present. No pericardial effusion. Lungs/pleura: Moderate to severe airspace opacity with some peripheral consolidation, volume loss, and air bronchograms within the right middle lobe. This is new over the past 4 months. In addition, there are smaller subsegmental patchy areas of airspace opacity and ground-glass opacity noted in all segments of both lungs compatible with multifocal pneumonia. No pleural effusion or pneumothorax. Central airways are patent. Soft Tissues/Bones: No acute finding. Multilevel degenerative disc changes seen within the thoracic spine. Abdomen/Pelvis: Organs: Liver, spleen, pancreas, adrenal glands are unremarkable. 2 or 3 small cortical cyst noted within the right kidney which are unchanged. Kidneys otherwise unremarkable. No hydronephrosis or perinephric stranding. No radiopaque stone seen in either kidney or ureter. GI/Bowel: Stomach and small bowel are unremarkable. Appendix is surgically absent. Colon is unremarkable. Pelvis: Surgical absence of uterus. No suspicious pelvic or adnexal mass or fluid collection. Urinary bladder is unremarkable. Peritoneum/Retroperitoneum: There are a few small subcentimeter lymph nodes within the retroperitoneal space-unchanged from prior exam. Bones/Soft Tissues: No acute findings. Multilevel degenerative disc changes and facet arthropathy is noted. Moderate right middle lobe pneumonia with some volume loss and consolidation. Mild multifocal airspace opacity noted throughout all remaining segments of both lungs compatible with multifocal pneumonia. Mildly prominent mediastinal and hilar lymphadenopathy either unchanged or slightly more prominent compared to July 2018.   These likely represent reactive lymph nodes from suspected multifocal pneumonia or sequela from chronic lymphocytic leukemia. No acute intra-abdominal or pelvic finding. Stable mildly prominent retroperitoneal lymph nodes-likely sequela of CLL. Cta Chest W Wo Contrast    Addendum Date: 11/23/2018    ADDENDUM: 3D images obtained and reviewed. No change to report. Result Date: 11/23/2018  EXAMINATION: CTA OF THE CHEST WITH AND WITHOUT CONTRAST 11/22/2018 8:56 pm TECHNIQUE: CTA of the chest was performed before and after the administration of intravenous contrast.  Multiplanar reformatted images are provided for review. MIP images are provided for review. Dose modulation, iterative reconstruction, and/or weight based adjustment of the mA/kV was utilized to reduce the radiation dose to as low as reasonably achievable. COMPARISON: None. HISTORY: ORDERING SYSTEM PROVIDED HISTORY: SHORTNESS OF BREATH PRODUCED BY EXERTION OR STRESS TECHNOLOGIST PROVIDED HISTORY: Ordering Physician Provided Reason for Exam: Shortness of breath produced by exertion or stress Acuity: Unknown Type of Exam: Unknown FINDINGS: Aorta: No evidence of thoracic aortic aneurysm or dissection. No acute abnormality of the aorta. Unremarkable appearing thoracic aorta on the noncontrast images. Mediastinum: No evidence of mediastinal lymphadenopathy. The heart and pericardium demonstrate no acute abnormality. No evidence of pulmonary embolism Lungs/Pleura: Scattered bilateral ground-glass opacities greater in the upper lobes. Mild centrilobular emphysema. Mild bronchiectasis. Small bilateral pleural effusion. Upper Abdomen: Limited images of the upper abdomen are unremarkable. Soft Tissues/Bones: No acute bone or soft tissue abnormality. 1. No evidence of pulmonary embolism. No acute findings of the thoracic aorta. 2. Bilateral pulmonary opacities, greater in the upper lobes, suspicious for edema or pneumonia.      Ct Abdomen Pelvis W Iv Contrast Additional Contrast? None    Result Date: 11/18/2018  EXAMINATION: CT OF THE CHEST WITH CONTRAST; CT OF THE ABDOMEN AND PELVIS WITH CONTRAST 11/18/2018 12:29 pm; 11/18/2018 12:30 pm TECHNIQUE: CT of the chest was performed with the administration of intravenous contrast. Multiplanar reformatted images are provided for review. Dose modulation, iterative reconstruction, and/or weight based adjustment of the mA/kV was utilized to reduce the radiation dose to as low as reasonably achievable.; CT of the abdomen and pelvis was performed with the administration of intravenous contrast. Multiplanar reformatted images are provided for review. Dose modulation, iterative reconstruction, and/or weight based adjustment of the mA/kV was utilized to reduce the radiation dose to as low as reasonably achievable. COMPARISON: CT scan of the chest, abdomen, and pelvis July 10, 2018. HISTORY: ORDERING SYSTEM PROVIDED HISTORY: CLL evaluation per dr Tim Mark TECHNOLOGIST PROVIDED HISTORY: Ordering Physician Provided Reason for Exam: cough Acuity: Unknown Type of Exam: Unknown; ORDERING SYSTEM PROVIDED HISTORY: CLL evaluation per Dr Tim Mark TECHNOLOGIST PROVIDED HISTORY: If patient is on cardiac monitor and/or pulse ox, they may be taken off cardiac monitor and pulse ox, left on O2 if currently on. All monitors reattached when patient returns to room. Additional Contrast?->None Ordering Physician Provided Reason for Exam: cough Acuity: Unknown Type of Exam: Unknown FINDINGS: Chest: Mediastinum: Calcified subcarinal and left hilar lymph nodes are again seen. There are mildly prominent right paratracheal, AP window, subcarinal, and bilateral hilar noncalcified lymph nodes present which are either unchanged or slightly more prominent compared to July 2018. Central pulmonary arteries are unremarkable. No acute finding within the thoracic aorta. Coronary artery calcifications are present. No pericardial effusion. Lungs/pleura:  Moderate to severe airspace opacity with some peripheral consolidation, volume loss, and air

## 2018-11-28 NOTE — PROGRESS NOTES
InfectiousDiseases   Progress Note      Admission Date: 11/18/2018  Hospital Day: Hospital Day: 11  Attending: Cheryl Serrato MD  [address] service: 11/28/2018    Chief complaint/ Reason for consult: The patient was seen today for the following:    · Sepsis with high-grade fever  · Acute respiratory failure with hypoxia  · Severe multifocal pneumonia  · Immunocompromised patient with CLL  · Status post chemotherapy with 6 cycles of Obintuzumab/Chlorambucil in October 2017    Subjective: Interval history: Patient was seen and examined at bedside. Interval history was obtained. The patient is on oral Levaquin. She is tolerating antibiotics okay     REVIEW OF SYSTEMS:      Review of Systems   Constitutional: Negative for chills, diaphoresis and unexpected weight change. HENT: Negative for congestion, ear discharge, ear pain, facial swelling, hearing loss, rhinorrhea and trouble swallowing. Eyes: Negative for photophobia, discharge, redness and visual disturbance. Respiratory: Negative for apnea, cough, choking, chest tightness, shortness of breath and stridor. Cardiovascular: Negative for chest pain and palpitations. Gastrointestinal: Negative for abdominal pain, blood in stool, diarrhea and nausea. Endocrine: Negative for polydipsia, polyphagia and polyuria. Genitourinary: Negative for difficulty urinating, dysuria, frequency, hematuria, menstrual problem and vaginal discharge. Musculoskeletal: Negative for arthralgias, joint swelling, myalgias and neck stiffness. Skin: Negative for color change and rash. Allergic/Immunologic: Negative for immunocompromised state. Neurological: Negative for dizziness, seizures, speech difficulty, light-headedness and headaches. Hematological: Negative for adenopathy. Psychiatric/Behavioral: Negative for agitation, hallucinations and suicidal ideas. Past Medical History: All past medical history reviewed today.     Past Medical History:  CLL (chronic lymphocytic leukemia) (HCC) C91.90    Malignant lymphoma, small lymphocytic (HCC) C83.00    Pneumonia due to organism J18.9    Septicemia ([facility]) A41.9    Immunocompromised patient ([facility] D84.9    Acute respiratory failure with hypoxia (HCC) J96.01    Status post chemotherapy Z92.21    Community acquired pneumonia J18.9    COPD, severity to be determined [facility] J44.9    Nicotine abuse Z72.0    Respiratory distress R06.03    High fever R50.9    Pneumonia due to Mycoplasma pneumoniae J15.7         Assessment:     The patient is a 66 y.o. old female who  has a past medical history of Allergic; Anxiety; Back pain; Cancer ([facility] Depression; Glaucoma; Hyperlipidemia; and Hypertension. with following problems:    · Sepsis with high-grade fever -  This is resolved  · Acute respiratory failure with hypoxia -  Mycoplasma pneumoniae pneumonia  · Severe multifocal pneumonia - improving with Levaquin  · Immunocompromised patient with CLL  · Status post chemotherapy with 6 cycles of Obintuzumab/Chlorambucil in October 2017  · Essential hypertension -   blood pressure stable  · Mixed hyperlipidemia  · Obesity Class 1 due to excess calorie intake : Body mass index is 32.42 kg/m². Discussion:      I had switched the patient to oral Levaquin yesterday. She is tolerating Levaquin okay. She is afebrile. WBC count is 3700 today. Fungal serology is negative. BAL Legionella PCR negative. No fungal organisms isolated except yeast which is her normal roger. Pneumocystis PCR negative in the BAL    Serum creatinine 0.9 today.     Plan:     Diagnostic Workup:    · Continue to follow  fever curve, WBC count and blood cultures  · Follow up on liver and renal function closely    Antimicrobials:    · Will continue oral Levaquin 750 mg every 24 hours  · Stop date for oral Levaquin will be December 2, 2018  · Cough and deep breathing excises  · Aspiration precautions  · DVT prophylaxis  · Port-A-Cath site

## 2018-11-28 NOTE — DISCHARGE INSTR - DIET

## 2018-11-30 LAB
COCCIDIOIDES AG EIA: NORMAL
COCCIDIOIDES AG-SOURCE: NORMAL

## 2018-12-05 NOTE — DISCHARGE SUMMARY
[facility] -- Physician Discharge Summary     Iban Hernández  1955  MRN: [id number]    Admit Date: 11/18/2018  Discharge Date: 11/28/2018  6:42 PM    Attending MD: No att. providers found  Discharging MD: Srinivas Sandoval MD  PCP: Luis San MD None None    Admission Diagnosis: PNA (pneumonia) [J18.9]  PNA (pneumonia) [J18.9]  DISCHARGE DIAGNOSIS: pneumonia 2/2 mycoplasma    Full Hospital Problem List:  Active Hospital Problems    Diagnosis Date Noted    Pneumonia due to Mycoplasma pneumoniae [J15.7]     Community acquired pneumonia [J18.9]     COPD, severity to be determined ([address] [J44.9]     Nicotine abuse [Z72.0]     Respiratory distress [R06.03]     High fever [R50.9]     Septicemia ([address] [A41.9]     Immunocompromised patient ([address] [D84.9]     Acute respiratory failure with hypoxia ([address] [J96.01]     Status post chemotherapy [Z92.21]     Pneumonia due to organism [J18.9] 11/18/2018    CLL (chronic lymphocytic leukemia) ([address] [C91.90] 08/24/2017    HTN (hypertension) [I10] 08/15/2017    HLD (hyperlipidemia) [E78.5] 08/15/2017    GERD (gastroesophageal reflux disease) [K21.9] 08/15/2017    Chronic back pain [M54.9, G89.29] 08/15/2017           Hospital Course:  66 y.o. female who presents here to the emergency department, the patient states that she has a history of CLL, and has chronic abdominal pain however she's been sick for the past 2 weeks with fevers and chills, and cough.  She sees Dr. Venkata Ahuja. Josephine Zhao denies any actual central chest pain, she does admit to fevers T-max of 101.5.  Nothing seems to make her feel completely better, she does state that she is tired of the coughing    cxr on admission shows RML pna  Admitted, placed on empiric iv abx With Sepsis, present on admission, suspected or probable causative organism Mycoplasma Pneumonia)    Pt also found to have Acute respiratory failure with hypoxia -  Mycoplasma pneumoniae pneumonia    Fungal serology unremarkable. The hilar silhouettes appear unremarkable. Patchy alveolar infiltrates in the right middle lobe have decreased but persists, infiltrates in the left parahilar lung have increased. No pneumothorax is seen. Visualized osseous structures appear unremarkable. Right IJ Port-A-Cath tip overlies the proximal superior vena cava level. Infiltrates right middle lobe have decreased, infiltrates left parahilar lung have increased. When correlating with prior CT chest, the findings are suspicious for areas shifting atelectasis with pneumonia. Xr Chest Standard (2 Vw)    Result Date: 11/18/2018  EXAMINATION: TWO VIEWS OF THE CHEST 11/18/2018 10:30 am COMPARISON: 07/10/2018 HISTORY: ORDERING SYSTEM PROVIDED HISTORY: Chest Discomfort TECHNOLOGIST PROVIDED HISTORY: Reason for exam:->Chest Discomfort Ordering Physician Provided Reason for Exam: Cough (Patient reporting cough, congestion and abdominal pain for the past 2 weeks. Also reporting a fever. Reports she has a history of CLL. ) Acuity: Unknown Type of Exam: Unknown FINDINGS: Port is seen in the right chest wall. New heterogeneous right basilar pulmonary opacity has developed. No significant pleural effusion. No evidence of pneumothorax. Cardiac and mediastinal silhouettes are unchanged. Developing right basilar consolidation, concerning for pneumonia. Radiographic follow-up to resolution recommended. Ct Chest W Contrast    Result Date: 11/21/2018  EXAMINATION: CT OF THE CHEST WITH CONTRAST 11/21/2018 9:59 am TECHNIQUE: CT of the chest was performed with the administration of intravenous contrast. Multiplanar reformatted images are provided for review. Dose modulation, iterative reconstruction, and/or weight based adjustment of the mA/kV was utilized to reduce the radiation dose to as low as reasonably achievable. COMPARISON: CT chest with contrast 11/18/2018.  HISTORY: ORDERING SYSTEM PROVIDED HISTORY: PNEUMONIA, UNRESOLVED TECHNOLOGIST absence of uterus. No suspicious pelvic or adnexal mass or fluid collection. Urinary bladder is unremarkable. Peritoneum/Retroperitoneum: There are a few small subcentimeter lymph nodes within the retroperitoneal space-unchanged from prior exam. Bones/Soft Tissues: No acute findings. Multilevel degenerative disc changes and facet arthropathy is noted. Moderate right middle lobe pneumonia with some volume loss and consolidation. Mild multifocal airspace opacity noted throughout all remaining segments of both lungs compatible with multifocal pneumonia. Mildly prominent mediastinal and hilar lymphadenopathy either unchanged or slightly more prominent compared to July 2018. These likely represent reactive lymph nodes from suspected multifocal pneumonia or sequela from chronic lymphocytic leukemia. No acute intra-abdominal or pelvic finding. Stable mildly prominent retroperitoneal lymph nodes-likely sequela of CLL. Cta Chest W Wo Contrast    Addendum Date: 11/23/2018    ADDENDUM: 3D images obtained and reviewed. No change to report. Result Date: 11/23/2018  EXAMINATION: CTA OF THE CHEST WITH AND WITHOUT CONTRAST 11/22/2018 8:56 pm TECHNIQUE: CTA of the chest was performed before and after the administration of intravenous contrast.  Multiplanar reformatted images are provided for review. MIP images are provided for review. Dose modulation, iterative reconstruction, and/or weight based adjustment of the mA/kV was utilized to reduce the radiation dose to as low as reasonably achievable. COMPARISON: None. HISTORY: ORDERING SYSTEM PROVIDED HISTORY: SHORTNESS OF BREATH PRODUCED BY EXERTION OR STRESS TECHNOLOGIST PROVIDED HISTORY: Ordering Physician Provided Reason for Exam: Shortness of breath produced by exertion or stress Acuity: Unknown Type of Exam: Unknown FINDINGS: Aorta: No evidence of thoracic aortic aneurysm or dissection. No acute abnormality of the aorta.   Unremarkable appearing thoracic aorta disc changes and facet arthropathy is noted. Moderate right middle lobe pneumonia with some volume loss and consolidation. Mild multifocal airspace opacity noted throughout all remaining segments of both lungs compatible with multifocal pneumonia. Mildly prominent mediastinal and hilar lymphadenopathy either unchanged or slightly more prominent compared to July 2018. These likely represent reactive lymph nodes from suspected multifocal pneumonia or sequela from chronic lymphocytic leukemia. No acute intra-abdominal or pelvic finding. Stable mildly prominent retroperitoneal lymph nodes-likely sequela of CLL. Ct Sinus Wo Contrast    Result Date: 11/22/2018  EXAMINATION: CT OF THE SINUS WITHOUT CONTRAST  11/22/2018 8:56 pm TECHNIQUE: CT of the sinuses was performed without the administration of intravenous contrast. Multiplanar reformatted images are provided for review. Dose modulation, iterative reconstruction, and/or weight based adjustment of the mA/kV was utilized to reduce the radiation dose to as low as reasonably achievable. COMPARISON: None HISTORY: ORDERING SYSTEM PROVIDED HISTORY: r/o fungal sinusitis TECHNOLOGIST PROVIDED HISTORY: Ordering Physician Provided Reason for Exam: r/o fungal sinusitis Acuity: Unknown Type of Exam: Unknown FINDINGS: SINUSES/MASTOIDS:  The maxillary, sphenoid, ethmoid and frontal sinuses are clear. The bilateral ostiomeatal units are patent. Ethmoid roofs are symmetric. The mastoid air cells are well aerated. SOFT TISSUES:  Visualized soft tissues demonstrate no acute abnormality. The visualized portion of the intracranial contents demonstrate no gross acute abnormality. No evidence of sinusitis.          Discharge Exam:  /60   Pulse 81   Temp 97.8 °F (36.6 °C) (Temporal)   Resp 14   Ht 5' 2\" (1.575 m)   Wt 177 lb 4 oz (80.4 kg)   SpO2 91%   BMI 32.42 kg/m²   General appearance: alert, appears stated age and cooperative  Head: Normocephalic, without (KLOR-CON) 20 MEQ packet  Take 20 mEq by mouth 2 times daily             pravastatin (PRAVACHOL) 20 MG tablet  Take 20 mg by mouth daily. SUMAtriptan (IMITREX) 25 MG tablet  Take 100 mg by mouth once as needed for Migraine                  Allergies: Allergies   Allergen Reactions    Toradol [Ketorolac Tromethamine] Anaphylaxis and Nausea Only     \"doesn't help anyway. \"     Acetaminophen     Haldol [Haloperidol Lactate]     Morphine      \"just doesn't work\"    Other     Prochlorperazine Maleate     Sulfamethoxazole-Trimethoprim Itching    Sumatriptan Other (See Comments)     From 2/25/2008 home med. Reconciliation record. No reaction noted. This entry to replace \"other-some other migraine meds\"    Trazodone Other (See Comments)     Nightmares    Vistaril [Hydroxyzine Hcl]     Biaxin [Clarithromycin] Nausea And Vomiting    Butorphanol Rash    Tramadol Rash       Follow up Instructions: Follow-up with PCP: Cheyanne Gonzalez MD in 2-4 wk .       Total time spent on day of discharge including face-to-face visit, examination, documentation, counseling, preparation of discharge plans and followup, and discharge medicine reconciliation and presciptions is 35 minutes    Signed:  Agatha Johnson MD  12/5/2018

## 2018-12-08 LAB — MISCELLANEOUS LAB TEST ORDER: NORMAL

## 2018-12-24 LAB
FUNGUS (MYCOLOGY) CULTURE: ABNORMAL
FUNGUS (MYCOLOGY) CULTURE: ABNORMAL
FUNGUS (MYCOLOGY) CULTURE: NORMAL
FUNGUS STAIN: ABNORMAL
FUNGUS STAIN: NORMAL
ORGANISM: ABNORMAL

## 2019-01-08 LAB
AFB CULTURE (MYCOBACTERIA): NORMAL
AFB CULTURE (MYCOBACTERIA): NORMAL
AFB SMEAR: NORMAL
AFB SMEAR: NORMAL

## 2019-11-19 ENCOUNTER — HOSPITAL ENCOUNTER (OUTPATIENT)
Dept: CT IMAGING | Age: 64
Discharge: HOME | End: 2019-11-19
Payer: MEDICARE

## 2019-11-19 DIAGNOSIS — C91.10 CLL (CHRONIC LYMPHOCYTIC LEUKEMIA) (HCC): ICD-10-CM

## 2019-11-19 LAB
A/G RATIO: 1.7 (ref 1.1–2.2)
ALBUMIN SERPL-MCNC: 4.7 G/DL (ref 3.4–5)
ALP BLD-CCNC: 94 U/L (ref 40–129)
ALT SERPL-CCNC: 12 U/L (ref 10–40)
ANION GAP SERPL CALCULATED.3IONS-SCNC: 11 MMOL/L (ref 3–16)
AST SERPL-CCNC: 15 U/L (ref 15–37)
BILIRUB SERPL-MCNC: 0.5 MG/DL (ref 0–1)
BUN BLDV-MCNC: 12 MG/DL (ref 7–20)
CALCIUM SERPL-MCNC: 9.4 MG/DL (ref 8.3–10.6)
CHLORIDE BLD-SCNC: 102 MMOL/L (ref 99–110)
CO2: 24 MMOL/L (ref 21–32)
CREAT SERPL-MCNC: 1 MG/DL (ref 0.6–1.2)
GFR AFRICAN AMERICAN: >60
GFR NON-AFRICAN AMERICAN: 56
GLOBULIN: 2.8 G/DL
GLUCOSE BLD-MCNC: 96 MG/DL (ref 70–99)
POTASSIUM SERPL-SCNC: 4.5 MMOL/L (ref 3.5–5.1)
SODIUM BLD-SCNC: 137 MMOL/L (ref 136–145)
TOTAL PROTEIN: 7.5 G/DL (ref 6.4–8.2)

## 2019-11-19 PROCEDURE — 36415 COLL VENOUS BLD VENIPUNCTURE: CPT

## 2019-11-19 PROCEDURE — 80053 COMPREHEN METABOLIC PANEL: CPT

## 2019-11-19 PROCEDURE — 74177 CT ABD & PELVIS W/CONTRAST: CPT

## 2019-11-19 PROCEDURE — 6360000004 HC RX CONTRAST MEDICATION: Performed by: INTERNAL MEDICINE

## 2019-11-19 RX ADMIN — IOHEXOL 50 ML: 240 INJECTION, SOLUTION INTRATHECAL; INTRAVASCULAR; INTRAVENOUS; ORAL at 14:09

## 2019-11-19 RX ADMIN — IOPAMIDOL 75 ML: 755 INJECTION, SOLUTION INTRAVENOUS at 14:09

## 2019-12-20 ENCOUNTER — OFFICE VISIT (OUTPATIENT)
Dept: PULMONOLOGY | Age: 64
End: 2019-12-20
Payer: MEDICARE

## 2019-12-20 VITALS
BODY MASS INDEX: 32.92 KG/M2 | RESPIRATION RATE: 16 BRPM | WEIGHT: 180 LBS | HEART RATE: 86 BPM | DIASTOLIC BLOOD PRESSURE: 74 MMHG | SYSTOLIC BLOOD PRESSURE: 116 MMHG | OXYGEN SATURATION: 96 %

## 2019-12-20 DIAGNOSIS — C91.10 CLL (CHRONIC LYMPHOCYTIC LEUKEMIA) (HCC): ICD-10-CM

## 2019-12-20 DIAGNOSIS — Z87.01: ICD-10-CM

## 2019-12-20 DIAGNOSIS — Z87.891 PERSONAL HISTORY OF TOBACCO USE: ICD-10-CM

## 2019-12-20 DIAGNOSIS — J44.9 COPD, SEVERITY TO BE DETERMINED (HCC): Primary | ICD-10-CM

## 2019-12-20 PROCEDURE — G8427 DOCREV CUR MEDS BY ELIG CLIN: HCPCS | Performed by: INTERNAL MEDICINE

## 2019-12-20 PROCEDURE — 99214 OFFICE O/P EST MOD 30 MIN: CPT | Performed by: INTERNAL MEDICINE

## 2019-12-20 PROCEDURE — G8417 CALC BMI ABV UP PARAM F/U: HCPCS | Performed by: INTERNAL MEDICINE

## 2019-12-20 PROCEDURE — 4004F PT TOBACCO SCREEN RCVD TLK: CPT | Performed by: INTERNAL MEDICINE

## 2019-12-20 PROCEDURE — G8484 FLU IMMUNIZE NO ADMIN: HCPCS | Performed by: INTERNAL MEDICINE

## 2019-12-20 PROCEDURE — G8926 SPIRO NO PERF OR DOC: HCPCS | Performed by: INTERNAL MEDICINE

## 2019-12-20 PROCEDURE — 3017F COLORECTAL CA SCREEN DOC REV: CPT | Performed by: INTERNAL MEDICINE

## 2019-12-20 PROCEDURE — 3023F SPIROM DOC REV: CPT | Performed by: INTERNAL MEDICINE

## 2019-12-20 ASSESSMENT — ENCOUNTER SYMPTOMS
ABDOMINAL DISTENTION: 0
SINUS PRESSURE: 0
ANAL BLEEDING: 0
CHOKING: 0
APNEA: 0
CHEST TIGHTNESS: 0
WHEEZING: 0
COUGH: 1
CONSTIPATION: 0
DIARRHEA: 0
ABDOMINAL PAIN: 0
RHINORRHEA: 0
STRIDOR: 0
SHORTNESS OF BREATH: 1
BACK PAIN: 0
VOICE CHANGE: 0
BLOOD IN STOOL: 0
SORE THROAT: 0

## 2020-04-16 ENCOUNTER — HOSPITAL ENCOUNTER (OUTPATIENT)
Dept: CT IMAGING | Age: 65
Discharge: HOME | End: 2020-04-16
Payer: MEDICARE

## 2020-04-16 LAB
A/G RATIO: 1.3 (ref 1.1–2.2)
ALBUMIN SERPL-MCNC: 4.1 G/DL (ref 3.4–5)
ALP BLD-CCNC: 122 U/L (ref 40–129)
ALT SERPL-CCNC: 15 U/L (ref 10–40)
ANION GAP SERPL CALCULATED.3IONS-SCNC: 12 MMOL/L (ref 3–16)
AST SERPL-CCNC: 16 U/L (ref 15–37)
BILIRUB SERPL-MCNC: <0.2 MG/DL (ref 0–1)
BUN BLDV-MCNC: 16 MG/DL (ref 7–20)
CALCIUM SERPL-MCNC: 9.3 MG/DL (ref 8.3–10.6)
CHLORIDE BLD-SCNC: 104 MMOL/L (ref 99–110)
CO2: 25 MMOL/L (ref 21–32)
CREAT SERPL-MCNC: 0.9 MG/DL (ref 0.6–1.2)
GFR AFRICAN AMERICAN: >60
GFR NON-AFRICAN AMERICAN: >60
GLOBULIN: 3.1 G/DL
GLUCOSE BLD-MCNC: 91 MG/DL (ref 70–99)
POTASSIUM SERPL-SCNC: 4.7 MMOL/L (ref 3.5–5.1)
SODIUM BLD-SCNC: 141 MMOL/L (ref 136–145)
TOTAL PROTEIN: 7.2 G/DL (ref 6.4–8.2)

## 2020-04-16 PROCEDURE — 80053 COMPREHEN METABOLIC PANEL: CPT

## 2020-04-16 PROCEDURE — 74177 CT ABD & PELVIS W/CONTRAST: CPT

## 2020-04-16 PROCEDURE — 70491 CT SOFT TISSUE NECK W/DYE: CPT

## 2020-04-16 PROCEDURE — 6360000004 HC RX CONTRAST MEDICATION: Performed by: INTERNAL MEDICINE

## 2020-04-16 PROCEDURE — 36415 COLL VENOUS BLD VENIPUNCTURE: CPT

## 2020-04-16 RX ADMIN — IOHEXOL 50 ML: 240 INJECTION, SOLUTION INTRATHECAL; INTRAVASCULAR; INTRAVENOUS; ORAL at 12:25

## 2020-04-16 RX ADMIN — IOPAMIDOL 75 ML: 755 INJECTION, SOLUTION INTRAVENOUS at 12:25

## 2020-04-21 ENCOUNTER — TELEPHONE (OUTPATIENT)
Dept: PULMONOLOGY | Age: 65
End: 2020-04-21

## 2020-04-21 NOTE — TELEPHONE ENCOUNTER
Patient had a CT done last week and would like to know if it has changed since her last one. Please call patient at [phone].

## 2020-06-24 ENCOUNTER — HOSPITAL ENCOUNTER (EMERGENCY)
Age: 65
Discharge: LEFT BEFORE DISCHARGE | End: 2020-06-24
Payer: MEDICARE

## 2020-06-24 VITALS
HEART RATE: 90 BPM | RESPIRATION RATE: 18 BRPM | TEMPERATURE: 97.5 F | HEIGHT: 63 IN | DIASTOLIC BLOOD PRESSURE: 87 MMHG | SYSTOLIC BLOOD PRESSURE: 142 MMHG | WEIGHT: 180 LBS | OXYGEN SATURATION: 98 % | BODY MASS INDEX: 31.89 KG/M2

## 2020-06-24 RX ORDER — PROMETHAZINE HYDROCHLORIDE 25 MG/1
TABLET ORAL
COMMUNITY
Start: 2020-06-18

## 2020-06-24 ASSESSMENT — PAIN SCALES - GENERAL: PAINLEVEL_OUTOF10: 10

## 2020-06-24 ASSESSMENT — PAIN DESCRIPTION - LOCATION: LOCATION: LEG

## 2020-06-24 ASSESSMENT — PAIN DESCRIPTION - ORIENTATION: ORIENTATION: LEFT

## 2020-06-24 ASSESSMENT — PAIN DESCRIPTION - PAIN TYPE: TYPE: ACUTE PAIN

## 2020-09-03 ENCOUNTER — HOSPITAL ENCOUNTER (OUTPATIENT)
Dept: CT IMAGING | Age: 65
Discharge: HOME | End: 2020-09-03
Payer: MEDICARE

## 2020-09-03 PROCEDURE — 74177 CT ABD & PELVIS W/CONTRAST: CPT

## 2020-09-03 PROCEDURE — 6360000004 HC RX CONTRAST MEDICATION: Performed by: INTERNAL MEDICINE

## 2020-09-03 RX ADMIN — IOPAMIDOL 75 ML: 755 INJECTION, SOLUTION INTRAVENOUS at 07:35

## 2020-09-03 RX ADMIN — IOHEXOL 50 ML: 240 INJECTION, SOLUTION INTRATHECAL; INTRAVASCULAR; INTRAVENOUS; ORAL at 09:30

## 2021-10-17 ENCOUNTER — HOSPITAL ENCOUNTER (EMERGENCY)
Age: 66
Discharge: HOME | End: 2021-10-17
Payer: MEDICARE

## 2021-10-17 VITALS
RESPIRATION RATE: 16 BRPM | HEART RATE: 78 BPM | DIASTOLIC BLOOD PRESSURE: 74 MMHG | BODY MASS INDEX: 29.59 KG/M2 | SYSTOLIC BLOOD PRESSURE: 113 MMHG | OXYGEN SATURATION: 99 % | WEIGHT: 164.4 LBS | TEMPERATURE: 98.4 F

## 2021-10-17 DIAGNOSIS — B02.9 HERPES ZOSTER WITHOUT COMPLICATION: Primary | ICD-10-CM

## 2021-10-17 DIAGNOSIS — F11.20 OPIATE DEPENDENCE, CONTINUOUS (HCC): ICD-10-CM

## 2021-10-17 PROCEDURE — 96372 THER/PROPH/DIAG INJ SC/IM: CPT

## 2021-10-17 PROCEDURE — 99283 EMERGENCY DEPT VISIT LOW MDM: CPT

## 2021-10-17 PROCEDURE — 6360000002 HC RX W HCPCS: Performed by: PHYSICIAN ASSISTANT

## 2021-10-17 RX ORDER — HYDROMORPHONE HYDROCHLORIDE 1 MG/ML
1 INJECTION, SOLUTION INTRAMUSCULAR; INTRAVENOUS; SUBCUTANEOUS ONCE
Status: COMPLETED | OUTPATIENT
Start: 2021-10-17 | End: 2021-10-17

## 2021-10-17 RX ADMIN — HYDROMORPHONE HYDROCHLORIDE 1 MG: 1 INJECTION, SOLUTION INTRAMUSCULAR; INTRAVENOUS; SUBCUTANEOUS at 21:57

## 2021-10-17 ASSESSMENT — PAIN SCALES - GENERAL
PAINLEVEL_OUTOF10: 8
PAINLEVEL_OUTOF10: 8

## 2021-10-17 ASSESSMENT — ENCOUNTER SYMPTOMS
VOMITING: 0
COLOR CHANGE: 1
NAUSEA: 0
DIARRHEA: 0
ABDOMINAL PAIN: 0
CHEST TIGHTNESS: 0
SHORTNESS OF BREATH: 0

## 2021-10-18 NOTE — ED PROVIDER NOTES
[facility]        Pt Name: Velma Sheppard  MRN: [id number]  [place] 1955  Date of evaluation: 10/17/2021  Provider: Susi Beyer PA-C  PCP: Octavio Vasquez MD  Note Started: 9:43 PM EDT       DEJA. I have evaluated this patient. My supervising physician was available for consultation. CHIEF COMPLAINT       Chief Complaint   Patient presents with    Flank Pain     d/t shingles. c/o increased pain on right side. on antiviral meds        HISTORY OF PRESENT ILLNESS   (Location, Timing/Onset, Context/Setting, Quality, Duration, Modifying Factors, Severity, Associated Signs and Symptoms)  Note limiting factors. Chief Complaint: Uncontrolled pain    Velma Sheppard is a 63 y.o. female who presents to the emergency department with reports of difficulties as a pertains to uncontrolled pain. Patient is under the active care of Dr. Ba Thompson from pain management. She is on oxycodone as well as Percocet 7.5 mg. She takes this regularly and has been taking this despite the fact that she is having difficulties as a pertains to shingles. She had been seen and evaluated by Dr. Youngblood Duty her primary care provider when she had difficulties with the eruption of the shingles rash. She was placed on acyclovir and has been taking this medication. Unfortunately is not controlling her pain and discomfort and the rash is spreading and this had her concerned. Patient states she has been on pain medications for an extended period of time. Her current level of pain and discomfort today is 8 out of 10. It is a burning sensation in the dermatome where she currently has right sided T11 dermatomal associated rash. Patient states because of inadequate pain relief she presents the ED for evaluation and treatment. Nursing Notes were all reviewed and agreed with or any disagreements were addressed in the HPI.     REVIEW OF SYSTEMS    (2-9 systems for level 4, 10 or more for level 5)     Review of Systems   Constitutional: Negative for activity change, chills and fever. Respiratory: Negative for chest tightness and shortness of breath. Cardiovascular: Negative for chest pain. Gastrointestinal: Negative for abdominal pain, diarrhea, nausea and vomiting. Genitourinary: Negative for dysuria and flank pain. Skin: Positive for color change and wound. All other systems reviewed and are negative. Positives and Pertinent negatives as per HPI. Except as noted above in the ROS, all other systems were reviewed and negative. PAST MEDICAL HISTORY     Past Medical History:   Diagnosis Date    Allergic     Anxiety     Back pain     Cancer ([facility]     lymphoma    Depression     Glaucoma     Hyperlipidemia     Hypertension          SURGICAL HISTORY     Past Surgical History:   Procedure Laterality Date    APPENDECTOMY       SECTION      COLONOSCOPY      ENDOSCOPY, COLON, DIAGNOSTIC      ERCP N/A 09/15/2017    HYSTERECTOMY      KNEE ARTHROSCOPY      right    [facility] [address] W/BRNCL ALVEOLAR LAVAGE N/A 2018    BRONCHOSCOPY ALVEOLAR LAVAGE performed by Sparkle Chris MD at [address] [facility] TUNNELED [facility] Right 10/10/2017    Power Port insertion with Chris Barry MD in [facility]  2017         CURRENTMEDICATIONS       Previous Medications    ALBUTEROL SULFATE  (90 BASE) MCG/ACT INHALER    Use 2 puffs 4 times daily for 7 days then as needed for wheezing. Dispense with Spacer and instruct in use. At patient's preference may use 60 dose MDI. May Sub Pro-Air or Proventil as needed per insurance. AMLODIPINE (NORVASC) 10 MG TABLET    Take 10 mg by mouth daily. CLOTRIMAZOLE-BETAMETHASONE (LOTRISONE) 1-0.05 % CREAM    Apply topically 2 times daily.     DOCUSATE SODIUM (COLACE) 100 MG CAPSULE    Take 1 capsule by mouth daily    FLUOXETINE (PROZAC) 20 MG CAPSULE Take 60 mg by mouth daily. FUROSEMIDE (LASIX) 20 MG TABLET    Take 20 mg by mouth daily     GABAPENTIN (NEURONTIN) 600 MG TABLET    Take 600 mg by mouth 4 times daily. OMEPRAZOLE (PRILOSEC) 40 MG CAPSULE    Take 40 mg by mouth daily. ONDANSETRON (ZOFRAN) 4 MG TABLET    Take 4 mg by mouth every 8 hours as needed for Nausea or Vomiting    OXYCODONE ER (XTAMPZA ER) 9 MG C12A    Xtampza ER 9 mg capsule sprinkle    OXYCODONE-ACETAMINOPHEN (PERCOCET)  MG PER TABLET    Take 1 tablet by mouth 4 times daily. Piotr Saleh POTASSIUM CHLORIDE (KLOR-CON) 20 MEQ PACKET    Take 20 mEq by mouth 2 times daily    PRAVASTATIN (PRAVACHOL) 20 MG TABLET    Take 40 mg by mouth daily     PROMETHAZINE (PHENERGAN) 25 MG TABLET    TK 1 T PO Q 4 TO 6 H PRF NAUSEA OR VOM    SUMATRIPTAN (IMITREX) 25 MG TABLET    Take 100 mg by mouth once as needed for Migraine     UMECLIDINIUM-VILANTEROL (ANORO ELLIPTA) 62.5-25 MCG/INH AEPB INHALER    Anoro Ellipta 62.5 mcg-25 mcg/actuation powder for inhalation         ALLERGIES     Ketorolac tromethamine, Enoxaparin, Tramadol, Trazodone, Warfarin, Acetaminophen-codeine, Haloperidol lactate, Hydroxyzine, Hydroxyzine pamoate, Ketorolac, Morphine, Other, Prochlorperazine edisylate, Prochlorperazine maleate, Propoxyphene, Sulfa antibiotics, Sulfamethoxazole-trimethoprim, Sumatriptan, Vistaril [hydroxyzine hcl], Amoxicillin-pot clavulanate, Butorphanol, Clarithromycin, Morphine and related, and Prochlorperazine    FAMILYHISTORY       Family History   Problem Relation Age of Onset    Stroke Mother     Depression Father     High Cholesterol Father     Migraines Maternal Aunt           SOCIAL HISTORY       Social History     Tobacco Use    Smoking status: Current Every Day Smoker     Packs/day: 1.50     Years: 35.00     Pack years: 52.50     Types: Cigarettes    Smokeless tobacco: Never Used   Vaping Use    Vaping Use: Never used   Substance Use Topics    Alcohol use: No    Drug use:  No SCREENINGS             PHYSICAL EXAM    (up to 7 for level 4, 8 or more for level 5)     ED Triage Vitals [10/17/21 2137]   BP Temp Temp Source Pulse Resp SpO2 Height Weight   113/74 98.4 °F (36.9 °C) Oral 78 16 99 % -- 164 lb 6.4 oz (74.6 kg)       Physical Exam  Vitals and nursing note reviewed. Constitutional:       General: She is awake. She is not in acute distress. Appearance: Normal appearance. She is well-developed. She is not ill-appearing or diaphoretic. HENT:      Head: Normocephalic and atraumatic. No raccoon eyes, Wang's sign or laceration. Right Ear: External ear normal.      Left Ear: External ear normal.   Eyes:      General: No scleral icterus. Right eye: No discharge. Left eye: No discharge. Conjunctiva/sclera: Conjunctivae normal.   Neck:      Vascular: No JVD. Cardiovascular:      Rate and Rhythm: Normal rate and regular rhythm. Heart sounds: No murmur heard. No friction rub. No gallop. Pulmonary:      Effort: Pulmonary effort is normal. No accessory muscle usage or respiratory distress. Breath sounds: Normal breath sounds. No wheezing, rhonchi or rales. Abdominal:      General: There is no distension. Palpations: Abdomen is soft. Abdomen is not rigid. There is no mass. Tenderness: There is no abdominal tenderness. There is no guarding or rebound. Musculoskeletal:      Cervical back: Normal range of motion. Skin:     General: Skin is warm and dry. Comments: T10 dermatomal vesicular rash primarily posterior base but extending anteriorly. No secondary skin infection. Neurological:      Mental Status: She is alert and oriented to person, place, and time. GCS: GCS eye subscore is 4. GCS verbal subscore is 5. GCS motor subscore is 6. Cranial Nerves: No cranial nerve deficit. Sensory: No sensory deficit.       Coordination: Coordination normal.   Psychiatric:         Behavior: Behavior normal. Behavior is cooperative. DIAGNOSTIC RESULTS   LABS:    Labs Reviewed - No data to display    When ordered only abnormal lab results are displayed. All other labs were within normal range or not returned as of this dictation. EKG: When ordered, EKG's are interpreted by the Emergency Department Physician in the absence of a cardiologist.  Please see their note for interpretation of EKG. RADIOLOGY:   Non-plain film images such as CT, Ultrasound and MRI are read by the radiologist. Plain radiographic images are visualized and preliminarily interpreted by the ED Provider with the below findings:        Interpretation per the Radiologist below, if available at the time of this note:    No orders to display     No results found. PROCEDURES   Unless otherwise noted below, none     Procedures    CRITICAL CARE TIME   N/A    CONSULTS:  None      EMERGENCY DEPARTMENT COURSE and DIFFERENTIAL DIAGNOSIS/MDM:   Vitals:    Vitals:    10/17/21 2137   BP: 113/74   Pulse: 78   Resp: 16   Temp: 98.4 °F (36.9 °C)   TempSrc: Oral   SpO2: 99%   Weight: 164 lb 6.4 oz (74.6 kg)       Patient was given the following medications:  Medications   HYDROmorphone HCl PF (DILAUDID) injection 1 mg (has no administration in time range)           The patient's detailed history of present illness is documented as above. Upon arrival to the emergency department the patient's vital signs are as documented. The patient is noted to be hemodynamically stable and afebrile. Physical examination findings are as above. The patient is miserable at the present time and she is maxing out her regular and usual opiates. Lengthy discussion was had with her in regards to this. She has multiple allergies and tells me she is had anaphylaxis from Toradol in the past and therefore we cannot utilize that. She has had her max 1 levels of Tylenol for the course of today. She is already on acyclovir.   Skin is not intact and therefore we cannot use Lidoderm

## 2022-01-11 ENCOUNTER — TELEPHONE (OUTPATIENT)
Dept: NEUROLOGY | Age: 67
End: 2022-01-11

## 2022-01-11 NOTE — TELEPHONE ENCOUNTER
Pt no-showed for NP appt on 1/11/22 after confirming therefore, pt can-not be rescheduled with Dr Memory Merlin.

## (undated) DEVICE — PROCEDURE KIT ENDOSCP CUST

## (undated) DEVICE — SINGLE USE SUCTION VALVE MAJ-209: Brand: SINGLE USE SUCTION VALVE (STERILE)

## (undated) DEVICE — 60 ML SYRINGE,REGULAR TIP: Brand: MONOJECT

## (undated) DEVICE — GOWN AURORA NONREINF LG: Brand: MEDLINE INDUSTRIES, INC.

## (undated) DEVICE — SPECIMEN TRAP: Brand: ARGYLE

## (undated) DEVICE — CONMED CHANNEL MASTER PULMONARY AND PEDIATRIC CLEANING BRUSH, 160 CM X 2.0 MM: Brand: CONMED

## (undated) DEVICE — SINGLE USE BIOPSY VALVE MAJ-210: Brand: SINGLE USE BIOPSY VALVE (STERILE)

## (undated) DEVICE — SYRINGE MED 10ML POLYPR LUERSLIP TIP FLAT TOP W/O SFTY DISP